# Patient Record
Sex: MALE | Race: WHITE | NOT HISPANIC OR LATINO
[De-identification: names, ages, dates, MRNs, and addresses within clinical notes are randomized per-mention and may not be internally consistent; named-entity substitution may affect disease eponyms.]

---

## 2021-05-20 ENCOUNTER — APPOINTMENT (OUTPATIENT)
Dept: UROLOGY | Facility: CLINIC | Age: 71
End: 2021-05-20
Payer: MEDICARE

## 2021-05-20 VITALS
WEIGHT: 273 LBS | DIASTOLIC BLOOD PRESSURE: 82 MMHG | BODY MASS INDEX: 36.18 KG/M2 | TEMPERATURE: 98.2 F | HEIGHT: 73 IN | OXYGEN SATURATION: 97 % | SYSTOLIC BLOOD PRESSURE: 128 MMHG | HEART RATE: 96 BPM

## 2021-05-20 DIAGNOSIS — Z86.79 PERSONAL HISTORY OF OTHER DISEASES OF THE CIRCULATORY SYSTEM: ICD-10-CM

## 2021-05-20 DIAGNOSIS — Z86.39 PERSONAL HISTORY OF OTHER ENDOCRINE, NUTRITIONAL AND METABOLIC DISEASE: ICD-10-CM

## 2021-05-20 DIAGNOSIS — Z87.442 PERSONAL HISTORY OF URINARY CALCULI: ICD-10-CM

## 2021-05-20 DIAGNOSIS — Z98.890 OTHER SPECIFIED POSTPROCEDURAL STATES: ICD-10-CM

## 2021-05-20 DIAGNOSIS — E11.9 TYPE 2 DIABETES MELLITUS W/OUT COMPLICATIONS: ICD-10-CM

## 2021-05-20 PROCEDURE — 99204 OFFICE O/P NEW MOD 45 MIN: CPT

## 2021-05-20 RX ORDER — BENAZEPRIL HYDROCHLORIDE 20 MG/1
20 TABLET, FILM COATED ORAL
Refills: 0 | Status: ACTIVE | COMMUNITY

## 2021-05-20 RX ORDER — GLIMEPIRIDE 4 MG/1
TABLET ORAL
Refills: 0 | Status: ACTIVE | COMMUNITY

## 2021-05-20 RX ORDER — SITAGLIPTIN AND METFORMIN HYDROCHLORIDE 50; 1000 MG/1; MG/1
TABLET, FILM COATED ORAL
Refills: 0 | Status: ACTIVE | COMMUNITY

## 2021-05-21 LAB
APPEARANCE: CLEAR
BACTERIA: NEGATIVE
BILIRUBIN URINE: NEGATIVE
BLOOD URINE: NEGATIVE
COLOR: NORMAL
GLUCOSE QUALITATIVE U: ABNORMAL
HYALINE CASTS: 0 /LPF
KETONES URINE: NEGATIVE
LEUKOCYTE ESTERASE URINE: NEGATIVE
MICROSCOPIC-UA: NORMAL
NITRITE URINE: NEGATIVE
PH URINE: 6
PROTEIN URINE: NEGATIVE
RED BLOOD CELLS URINE: 0 /HPF
SPECIFIC GRAVITY URINE: 1.02
SQUAMOUS EPITHELIAL CELLS: 0 /HPF
UROBILINOGEN URINE: NORMAL
WHITE BLOOD CELLS URINE: 0 /HPF

## 2021-05-21 NOTE — HISTORY OF PRESENT ILLNESS
[FreeTextEntry1] : ANGELLA COHN is a 70 year M who presents for ? kidney pain, stones, stone growth. H/o stones in the past- ? recent enlargement of stone, possible renal cyst, on outside imaging (no films or report). Mild voiding sx, worsened now; decreased flow. No hematuria. O/w feels well, no flank or abdominal pain.\par \par PMH: DM, htn, cholesterol, kidney stones\par PSH: stone removal, knee replacement\par FH: kidney stones.\par Meds: reviewed on history sheet\par SH: occasional etoh, quit smoking 28 years ago\par Allergies:nkda\par  [Weak Stream] : weak stream

## 2021-05-21 NOTE — ASSESSMENT
[FreeTextEntry1] : Mild voiding sx, and reported h/o stone, renal cyst which may have enlarged on outside US ([facility])\par \par D/w pt BPH issues as most likely for urinary flow issues--> can try saw palmetto as sx seem fairly mild at this time.  Of priority is to assess for degree of stone disease- pt reports no abd ct yet to fully assess stone burden, size, density, anatomy.\par \par H/o stones in the past---> ESWL\par \par 1. u/a with micro today\par 2. trial saw palmetto- pt prefers to starting with meds\par 3. CT stonehunt-- will change to CT urogram if u/a with sig rbc.\par 4. RTC to review, or can f/u as telehealth to review

## 2021-05-21 NOTE — REVIEW OF SYSTEMS
[Feeling Tired] : feeling tired [Abdominal Pain] : abdominal pain [Heartburn] : heartburn [Wake up at night to urinate  How many times?  ___] : wakes up to urinate [unfilled] times during the night [Slow urine stream] : slow urine stream [Joint Pain] : joint pain [Joint Swelling] : joint swelling [Feelings Of Weakness] : feelings of weakness [Negative] : Heme/Lymph [FreeTextEntry5] : KIDNEY STONES [FreeTextEntry2] : FREQUENT URINATION

## 2021-05-21 NOTE — LETTER BODY
[Dear  ___] : Dear  [unfilled], [Consult Letter:] : I had the pleasure of evaluating your patient, [unfilled]. [Please see my note below.] : Please see my note below. [Consult Closing:] : Thank you very much for allowing me to participate in the care of this patient.  If you have any questions, please do not hesitate to contact me. [Sincerely,] : Sincerely, [FreeTextEntry1] : ANGELLA COHN is a 70 year M who presents for ? kidney pain, stones, stone growth. H/o stones in the past- ? recent enlargement of stone, possible renal cyst, on outside imaging (no films or report). Mild voiding sx, worsened now; decreased flow. No hematuria. O/w feels well, no flank or abdominal pain.\par \par PMH: DM, htn, cholesterol, kidney stones\par PSH: stone removal, knee replacement\par FH: kidney stones.\par Meds: reviewed on history sheet\par SH: occasional etoh, quit smoking 28 years ago\par Allergies:nkda\par \par PE unremarkable\par \par Mild voiding sx, and reported h/o stone, renal cyst which may have enlarged on outside US ([facility])\par \par D/w pt BPH issues as most likely for urinary flow issues--> can try saw palmetto as sx seem fairly mild at this time.  Of priority is to assess for degree of stone disease- pt reports no abd ct yet to fully assess stone burden, size, density, anatomy.\par \par H/o stones in the past---> ESWL\par \par 1. u/a with micro today\par 2. trial saw palmetto- pt prefers to starting with meds\par 3. CT stonehunt-- will change to CT urogram if u/a with sig rbc.\par 4. RTC to review, or can f/u as telehealth to review

## 2021-05-25 ENCOUNTER — OUTPATIENT (OUTPATIENT)
Dept: OUTPATIENT SERVICES | Facility: HOSPITAL | Age: 71
LOS: 1 days | End: 2021-05-25
Payer: MEDICARE

## 2021-05-25 ENCOUNTER — APPOINTMENT (OUTPATIENT)
Dept: CT IMAGING | Facility: CLINIC | Age: 71
End: 2021-05-25
Payer: MEDICARE

## 2021-05-25 DIAGNOSIS — N20.0 CALCULUS OF KIDNEY: ICD-10-CM

## 2021-05-25 PROCEDURE — 74176 CT ABD & PELVIS W/O CONTRAST: CPT | Mod: 26,ME

## 2021-05-25 PROCEDURE — G1004: CPT

## 2021-05-25 PROCEDURE — 74176 CT ABD & PELVIS W/O CONTRAST: CPT

## 2021-05-28 ENCOUNTER — NON-APPOINTMENT (OUTPATIENT)
Age: 71
End: 2021-05-28

## 2021-06-10 ENCOUNTER — APPOINTMENT (OUTPATIENT)
Dept: UROLOGY | Facility: CLINIC | Age: 71
End: 2021-06-10
Payer: MEDICARE

## 2021-06-10 VITALS — TEMPERATURE: 98 F

## 2021-06-10 PROCEDURE — 99213 OFFICE O/P EST LOW 20 MIN: CPT

## 2021-06-15 NOTE — HISTORY OF PRESENT ILLNESS
[FreeTextEntry1] : Pt returns in f/u to review CT stone hunt- 5/25/21.\par \par Films reviewed with pt- has 8x6 mm left upper pole stone without hydro. Mult right renal cysts. Multiple bladder diverticula up to ~ 6 cm. No bladder stones. Markedly enlarged prostate with median lobe extending into bladder lumen.\par \par No other changes from recent appt.

## 2021-06-15 NOTE — ASSESSMENT
[FreeTextEntry1] : D/w pt at length; voiding sx troublesome for pt. Interested in surgical management given sx and large diverticulum.\par \par Options reviewed, including cysto, but with configuration of prostate on CT would suggest robotic prostatectomy optimal, as would allow treatment of the diverticulum at same time. Options for medical management again reviewed.\par \par He would like eval with Dr. Davenport- will arrange

## 2021-06-21 ENCOUNTER — APPOINTMENT (OUTPATIENT)
Dept: UROLOGY | Facility: CLINIC | Age: 71
End: 2021-06-21
Payer: MEDICARE

## 2021-06-21 PROCEDURE — 99213 OFFICE O/P EST LOW 20 MIN: CPT

## 2021-06-23 NOTE — ASSESSMENT
[FreeTextEntry1] : Discussed management with suprapubic prostatectomy and diverticulectomy. Discussed risks of surgery including bleeding, infection, injury to intra-abdominal contents, urine leak. Discussed risks of ED and incontinence (low risk for this procedure). Discussed unclear how close tic is to UO and may need stent and or ureteral reimplant. \par --Schedule for robotic SPP with diverticulectomy and cystoscopy\par --Medical clearance

## 2021-06-25 ENCOUNTER — OUTPATIENT (OUTPATIENT)
Dept: OUTPATIENT SERVICES | Facility: HOSPITAL | Age: 71
LOS: 1 days | End: 2021-06-25

## 2021-06-25 VITALS
DIASTOLIC BLOOD PRESSURE: 70 MMHG | RESPIRATION RATE: 16 BRPM | HEART RATE: 88 BPM | WEIGHT: 276.02 LBS | HEIGHT: 73 IN | SYSTOLIC BLOOD PRESSURE: 150 MMHG | TEMPERATURE: 97 F | OXYGEN SATURATION: 97 %

## 2021-06-25 DIAGNOSIS — E11.9 TYPE 2 DIABETES MELLITUS WITHOUT COMPLICATIONS: ICD-10-CM

## 2021-06-25 DIAGNOSIS — N40.0 BENIGN PROSTATIC HYPERPLASIA WITHOUT LOWER URINARY TRACT SYMPTOMS: ICD-10-CM

## 2021-06-25 DIAGNOSIS — Z96.659 PRESENCE OF UNSPECIFIED ARTIFICIAL KNEE JOINT: Chronic | ICD-10-CM

## 2021-06-25 DIAGNOSIS — G47.33 OBSTRUCTIVE SLEEP APNEA (ADULT) (PEDIATRIC): ICD-10-CM

## 2021-06-25 DIAGNOSIS — N32.3 DIVERTICULUM OF BLADDER: ICD-10-CM

## 2021-06-25 DIAGNOSIS — N32.1 VESICOINTESTINAL FISTULA: ICD-10-CM

## 2021-06-25 DIAGNOSIS — I10 ESSENTIAL (PRIMARY) HYPERTENSION: ICD-10-CM

## 2021-06-25 LAB
A1C WITH ESTIMATED AVERAGE GLUCOSE RESULT: 7.4 % — HIGH (ref 4–5.6)
ANION GAP SERPL CALC-SCNC: 18 MMOL/L — HIGH (ref 7–14)
BLD GP AB SCN SERPL QL: NEGATIVE — SIGNIFICANT CHANGE UP
BUN SERPL-MCNC: 11 MG/DL — SIGNIFICANT CHANGE UP (ref 7–23)
CALCIUM SERPL-MCNC: 10.3 MG/DL — SIGNIFICANT CHANGE UP (ref 8.4–10.5)
CHLORIDE SERPL-SCNC: 100 MMOL/L — SIGNIFICANT CHANGE UP (ref 98–107)
CO2 SERPL-SCNC: 23 MMOL/L — SIGNIFICANT CHANGE UP (ref 22–31)
CREAT SERPL-MCNC: 0.54 MG/DL — SIGNIFICANT CHANGE UP (ref 0.5–1.3)
ESTIMATED AVERAGE GLUCOSE: 166 MG/DL — HIGH (ref 68–114)
GLUCOSE SERPL-MCNC: 248 MG/DL — HIGH (ref 70–99)
HCT VFR BLD CALC: 46.3 % — SIGNIFICANT CHANGE UP (ref 39–50)
HGB BLD-MCNC: 15.3 G/DL — SIGNIFICANT CHANGE UP (ref 13–17)
MCHC RBC-ENTMCNC: 28.9 PG — SIGNIFICANT CHANGE UP (ref 27–34)
MCHC RBC-ENTMCNC: 33 GM/DL — SIGNIFICANT CHANGE UP (ref 32–36)
MCV RBC AUTO: 87.4 FL — SIGNIFICANT CHANGE UP (ref 80–100)
NRBC # BLD: 0 /100 WBCS — SIGNIFICANT CHANGE UP
NRBC # FLD: 0 K/UL — SIGNIFICANT CHANGE UP
PLATELET # BLD AUTO: 282 K/UL — SIGNIFICANT CHANGE UP (ref 150–400)
POTASSIUM SERPL-MCNC: 3.6 MMOL/L — SIGNIFICANT CHANGE UP (ref 3.5–5.3)
POTASSIUM SERPL-SCNC: 3.6 MMOL/L — SIGNIFICANT CHANGE UP (ref 3.5–5.3)
RBC # BLD: 5.3 M/UL — SIGNIFICANT CHANGE UP (ref 4.2–5.8)
RBC # FLD: 13.8 % — SIGNIFICANT CHANGE UP (ref 10.3–14.5)
RH IG SCN BLD-IMP: POSITIVE — SIGNIFICANT CHANGE UP
SODIUM SERPL-SCNC: 141 MMOL/L — SIGNIFICANT CHANGE UP (ref 135–145)
WBC # BLD: 8.09 K/UL — SIGNIFICANT CHANGE UP (ref 3.8–10.5)
WBC # FLD AUTO: 8.09 K/UL — SIGNIFICANT CHANGE UP (ref 3.8–10.5)

## 2021-06-25 RX ORDER — SODIUM CHLORIDE 9 MG/ML
1000 INJECTION, SOLUTION INTRAVENOUS
Refills: 0 | Status: DISCONTINUED | OUTPATIENT
Start: 2021-06-29 | End: 2021-06-29

## 2021-06-25 NOTE — H&P PST ADULT - NSICDXPASTMEDICALHX_GEN_ALL_CORE_FT
PAST MEDICAL HISTORY:  Arthritis s/p Bilateral TKR    Diabetes mellitus x 10 years FS this am 160    HTN (hypertension)     Hypercholesterolemia      PAST MEDICAL HISTORY:  Arthritis s/p Bilateral TKR    Diabetes mellitus x 10 years FS this am 160    HTN (hypertension)     Hypercholesterolemia     Kidney stones Per pt Left ; being monitored     PAST MEDICAL HISTORY:  Arthritis s/p Bilateral TKR    Diabetes mellitus x 10 years FS this am 160    HTN (hypertension)     Hypercholesterolemia     Kidney stones Per pt Left ; as per pt ; being monitored

## 2021-06-25 NOTE — H&P PST ADULT - NSICDXFAMILYHX_GEN_ALL_CORE_FT
FAMILY HISTORY:  Father  Still living? Unknown  FH: diabetes mellitus, Age at diagnosis: Age Unknown    Grandparent  Still living? Unknown  FH: diabetes mellitus, Age at diagnosis: Age Unknown    Aunt  Still living? No  FH: diabetes mellitus, Age at diagnosis: Age Unknown

## 2021-06-25 NOTE — H&P PST ADULT - HISTORY OF PRESENT ILLNESS
Pt is a 70 y.o. male ; pt reports h/o Left renal stones ; pt to surgeon a sonogram was done; per pt my prostate is enlarged and it is putting pressure on my bladder  " Pt now presents for Robotic Assisted Laparoscopic Suprapubic Prostatectomy with Bladder Diverticulectomy Cystoscopy  Pt is a 70 y.o. male ; pt to surgeon a sonogram was done; per pt my prostate is enlarged and it is putting pressure on my bladder  " Pt now presents for Robotic Assisted Laparoscopic Suprapubic Prostatectomy with Bladder Diverticulectomy Cystoscopy

## 2021-06-25 NOTE — H&P PST ADULT - NSICDXPROBLEM_GEN_ALL_CORE_FT
PROBLEM DIAGNOSES  Problem: Enlarged prostate  Assessment and Plan:     Problem: HTN (hypertension)  Assessment and Plan:     Problem: Diabetes mellitus  Assessment and Plan:        PROBLEM DIAGNOSES  Problem: Enlarged prostate  Assessment and Plan: Robotic Assisted Laparoscopic Suprapubic Prostatectomy with Bladder Diverticulectomy Cystoscopy  Pre op instructions including Pepcid for GI Prophalaxis and Hibiclens with teach back reviewed with pt ; pt verbalized good understanding of pre op instructions  Pt to Dr Crowley for pre op Evaluation EKG [ 6/22/2021 } to be faxed s/w Steph      Problem: HTN (hypertension)  Assessment and Plan: Pt to take Nifedipine and Benazepril dos     Problem: Diabetes mellitus  Assessment and Plan: BMP, HGBA1C done 6/25/2021  Janumet to be held evening prior to surgery and dos   Glimepiride to be held evening prior to surgery   Jardiance to be held 3 days pre op  Pt to review pre op plan for diabetes with Dr Crowley prescribing physician  FS dos     Problem: PENNY (obstructive sleep apnea)  Assessment and Plan: PENNY Precautions  OR Booking notified via fax       PROBLEM DIAGNOSES  Problem: Enlarged prostate  Assessment and Plan: Robotic Assisted Laparoscopic Suprapubic Prostatectomy with Bladder Diverticulectomy Cystoscopy  Pre op instructions including Pepcid for GI Prophalaxis and Hibiclens with teach back reviewed with pt ; pt verbalized good understanding of pre op instructions  Pt to Dr Crowley for pre op Evaluation EKG [ 6/22/2021 } to be faxed s/w Steph      Problem: HTN (hypertension)  Assessment and Plan: Pt to take Nifedipine and Benazepril dos     Problem: Diabetes mellitus  Assessment and Plan: BMP, HGBA1C done 6/25/2021  Janumet to be held evening prior to surgery and dos   Glimepiride to be held evening prior to surgery   Jardiance to be held 3 days pre op  Pt to review pre op plan for diabetes with Dr Crowley ; prescribing physician  FS dos     Problem: PENNY (obstructive sleep apnea)  Assessment and Plan: PENNY Precautions  OR Booking notified via fax

## 2021-06-26 LAB
CULTURE RESULTS: NO GROWTH — SIGNIFICANT CHANGE UP
SPECIMEN SOURCE: SIGNIFICANT CHANGE UP

## 2021-06-28 ENCOUNTER — TRANSCRIPTION ENCOUNTER (OUTPATIENT)
Age: 71
End: 2021-06-28

## 2021-06-28 NOTE — ASU PATIENT PROFILE, ADULT - NS PRO ABUSE SCREEN AFRAID ANYONE YN
REVIEW OF SYSTEMS:    GENERAL:  Patient denies fever, chills, tiredness, malaise.  EYES:  Patient denies blurred vision, double vision, pain, burning and itching.   NEUROLOGIC:  Patient denies tremors, dizzy spells, numbness, tingling, vision change, loss of balance.  ENDOCRINE:  Patient denies excessive thirst, heat intolerance, lymphnode enlargement.  GASTROINTESTINAL:  Patient denies abdominal pain, nausea/vomiting, indigestion/heartburn, diarrhea, constipation.  CARDIOVASCUALR:  Patient denies chest pain, varicose veins, high blood pressure.  SKIN:  Patient denies skin rashes, boils, persistent itching, acne.  MUSCULOSKELETAL:  Patient denies joint pain, neck pain, back pain, leg swelling.  ENT/MOUTH:  Patient denies ear infections, sore throats, sinus problems, hearing loss.  RESPIRATORY:  Patient denies wheezing, frequent cough, shortness of breath.   :  Patient denies urine retention, painful urination, urinary frequency, blood in urine, nocturia.  HEME/LYMPHATICE:  Patient denies swollen glands, blood clotting problems.   PSYCHOLOGICAL:  Patient denies anxiety, depression.         no

## 2021-06-29 ENCOUNTER — RESULT REVIEW (OUTPATIENT)
Age: 71
End: 2021-06-29

## 2021-06-29 ENCOUNTER — INPATIENT (INPATIENT)
Facility: HOSPITAL | Age: 71
LOS: 1 days | Discharge: HOME | End: 2021-07-01
Attending: UROLOGY | Admitting: UROLOGY
Payer: MEDICARE

## 2021-06-29 ENCOUNTER — APPOINTMENT (OUTPATIENT)
Dept: UROLOGY | Facility: HOSPITAL | Age: 71
End: 2021-06-29

## 2021-06-29 VITALS
TEMPERATURE: 98 F | HEIGHT: 73 IN | WEIGHT: 276.02 LBS | RESPIRATION RATE: 16 BRPM | DIASTOLIC BLOOD PRESSURE: 79 MMHG | OXYGEN SATURATION: 97 % | SYSTOLIC BLOOD PRESSURE: 138 MMHG | HEART RATE: 88 BPM

## 2021-06-29 DIAGNOSIS — N32.3 DIVERTICULUM OF BLADDER: ICD-10-CM

## 2021-06-29 DIAGNOSIS — Z96.659 PRESENCE OF UNSPECIFIED ARTIFICIAL KNEE JOINT: Chronic | ICD-10-CM

## 2021-06-29 DIAGNOSIS — N32.1 VESICOINTESTINAL FISTULA: ICD-10-CM

## 2021-06-29 LAB
ANION GAP SERPL CALC-SCNC: 14 MMOL/L — SIGNIFICANT CHANGE UP (ref 7–14)
BASOPHILS # BLD AUTO: 0.04 K/UL — SIGNIFICANT CHANGE UP (ref 0–0.2)
BASOPHILS NFR BLD AUTO: 0.2 % — SIGNIFICANT CHANGE UP (ref 0–2)
BUN SERPL-MCNC: 9 MG/DL — SIGNIFICANT CHANGE UP (ref 7–23)
CALCIUM SERPL-MCNC: 8.9 MG/DL — SIGNIFICANT CHANGE UP (ref 8.4–10.5)
CHLORIDE SERPL-SCNC: 103 MMOL/L — SIGNIFICANT CHANGE UP (ref 98–107)
CO2 SERPL-SCNC: 24 MMOL/L — SIGNIFICANT CHANGE UP (ref 22–31)
CREAT SERPL-MCNC: 0.51 MG/DL — SIGNIFICANT CHANGE UP (ref 0.5–1.3)
EOSINOPHIL # BLD AUTO: 0.04 K/UL — SIGNIFICANT CHANGE UP (ref 0–0.5)
EOSINOPHIL NFR BLD AUTO: 0.2 % — SIGNIFICANT CHANGE UP (ref 0–6)
GLUCOSE BLDC GLUCOMTR-MCNC: 175 MG/DL — HIGH (ref 70–99)
GLUCOSE BLDC GLUCOMTR-MCNC: 195 MG/DL — HIGH (ref 70–99)
GLUCOSE BLDC GLUCOMTR-MCNC: 204 MG/DL — HIGH (ref 70–99)
GLUCOSE SERPL-MCNC: 230 MG/DL — HIGH (ref 70–99)
HCT VFR BLD CALC: 42.2 % — SIGNIFICANT CHANGE UP (ref 39–50)
HGB BLD-MCNC: 13.9 G/DL — SIGNIFICANT CHANGE UP (ref 13–17)
IANC: 13.61 K/UL — HIGH (ref 1.5–8.5)
IMM GRANULOCYTES NFR BLD AUTO: 0.3 % — SIGNIFICANT CHANGE UP (ref 0–1.5)
LYMPHOCYTES # BLD AUTO: 13.1 % — SIGNIFICANT CHANGE UP (ref 13–44)
LYMPHOCYTES # BLD AUTO: 2.2 K/UL — SIGNIFICANT CHANGE UP (ref 1–3.3)
MCHC RBC-ENTMCNC: 28.7 PG — SIGNIFICANT CHANGE UP (ref 27–34)
MCHC RBC-ENTMCNC: 32.9 GM/DL — SIGNIFICANT CHANGE UP (ref 32–36)
MCV RBC AUTO: 87.2 FL — SIGNIFICANT CHANGE UP (ref 80–100)
MONOCYTES # BLD AUTO: 0.88 K/UL — SIGNIFICANT CHANGE UP (ref 0–0.9)
MONOCYTES NFR BLD AUTO: 5.2 % — SIGNIFICANT CHANGE UP (ref 2–14)
NEUTROPHILS # BLD AUTO: 13.61 K/UL — HIGH (ref 1.8–7.4)
NEUTROPHILS NFR BLD AUTO: 81 % — HIGH (ref 43–77)
NRBC # BLD: 0 /100 WBCS — SIGNIFICANT CHANGE UP
NRBC # FLD: 0 K/UL — SIGNIFICANT CHANGE UP
PLATELET # BLD AUTO: 254 K/UL — SIGNIFICANT CHANGE UP (ref 150–400)
POTASSIUM SERPL-MCNC: 3.9 MMOL/L — SIGNIFICANT CHANGE UP (ref 3.5–5.3)
POTASSIUM SERPL-SCNC: 3.9 MMOL/L — SIGNIFICANT CHANGE UP (ref 3.5–5.3)
RBC # BLD: 4.84 M/UL — SIGNIFICANT CHANGE UP (ref 4.2–5.8)
RBC # FLD: 13.6 % — SIGNIFICANT CHANGE UP (ref 10.3–14.5)
SODIUM SERPL-SCNC: 141 MMOL/L — SIGNIFICANT CHANGE UP (ref 135–145)
WBC # BLD: 16.82 K/UL — HIGH (ref 3.8–10.5)
WBC # FLD AUTO: 16.82 K/UL — HIGH (ref 3.8–10.5)

## 2021-06-29 PROCEDURE — 51999 UNLISTED LAPS PX BLADDER: CPT

## 2021-06-29 PROCEDURE — 88307 TISSUE EXAM BY PATHOLOGIST: CPT | Mod: 26

## 2021-06-29 PROCEDURE — 88309 TISSUE EXAM BY PATHOLOGIST: CPT | Mod: 26

## 2021-06-29 PROCEDURE — 55899 UNLISTED PX MALE GENITAL SYS: CPT

## 2021-06-29 RX ORDER — SITAGLIPTIN AND METFORMIN HYDROCHLORIDE 500; 50 MG/1; MG/1
1 TABLET, FILM COATED ORAL
Qty: 0 | Refills: 0 | DISCHARGE

## 2021-06-29 RX ORDER — CEFAZOLIN SODIUM 1 G
3000 VIAL (EA) INJECTION EVERY 8 HOURS
Refills: 0 | Status: DISCONTINUED | OUTPATIENT
Start: 2021-06-29 | End: 2021-07-01

## 2021-06-29 RX ORDER — OXYCODONE HYDROCHLORIDE 5 MG/1
5 TABLET ORAL ONCE
Refills: 0 | Status: DISCONTINUED | OUTPATIENT
Start: 2021-06-29 | End: 2021-06-30

## 2021-06-29 RX ORDER — BENAZEPRIL HYDROCHLORIDE AND HYDROCHLOROTHIAZIDE 10; 12.5 MG/1; MG/1
1 TABLET, FILM COATED ORAL
Qty: 0 | Refills: 0 | DISCHARGE

## 2021-06-29 RX ORDER — GLUCAGON INJECTION, SOLUTION 0.5 MG/.1ML
1 INJECTION, SOLUTION SUBCUTANEOUS ONCE
Refills: 0 | Status: DISCONTINUED | OUTPATIENT
Start: 2021-06-29 | End: 2021-07-01

## 2021-06-29 RX ORDER — NIFEDIPINE 30 MG
1 TABLET, EXTENDED RELEASE 24 HR ORAL
Qty: 0 | Refills: 0 | DISCHARGE

## 2021-06-29 RX ORDER — NIFEDIPINE 30 MG
30 TABLET, EXTENDED RELEASE 24 HR ORAL DAILY
Refills: 0 | Status: DISCONTINUED | OUTPATIENT
Start: 2021-06-29 | End: 2021-07-01

## 2021-06-29 RX ORDER — SODIUM CHLORIDE 9 MG/ML
1000 INJECTION, SOLUTION INTRAVENOUS
Refills: 0 | Status: DISCONTINUED | OUTPATIENT
Start: 2021-06-29 | End: 2021-06-30

## 2021-06-29 RX ORDER — CEFAZOLIN SODIUM 1 G
2000 VIAL (EA) INJECTION EVERY 8 HOURS
Refills: 0 | Status: DISCONTINUED | OUTPATIENT
Start: 2021-06-29 | End: 2021-06-29

## 2021-06-29 RX ORDER — ONDANSETRON 8 MG/1
4 TABLET, FILM COATED ORAL ONCE
Refills: 0 | Status: DISCONTINUED | OUTPATIENT
Start: 2021-06-29 | End: 2021-06-30

## 2021-06-29 RX ORDER — HEPARIN SODIUM 5000 [USP'U]/ML
5000 INJECTION INTRAVENOUS; SUBCUTANEOUS EVERY 8 HOURS
Refills: 0 | Status: DISCONTINUED | OUTPATIENT
Start: 2021-06-29 | End: 2021-07-01

## 2021-06-29 RX ORDER — ACETAMINOPHEN 500 MG
650 TABLET ORAL EVERY 6 HOURS
Refills: 0 | Status: DISCONTINUED | OUTPATIENT
Start: 2021-06-29 | End: 2021-07-01

## 2021-06-29 RX ORDER — LISINOPRIL 2.5 MG/1
20 TABLET ORAL DAILY
Refills: 0 | Status: DISCONTINUED | OUTPATIENT
Start: 2021-06-29 | End: 2021-07-01

## 2021-06-29 RX ORDER — DEXTROSE 50 % IN WATER 50 %
25 SYRINGE (ML) INTRAVENOUS ONCE
Refills: 0 | Status: DISCONTINUED | OUTPATIENT
Start: 2021-06-29 | End: 2021-07-01

## 2021-06-29 RX ORDER — SODIUM CHLORIDE 9 MG/ML
1000 INJECTION, SOLUTION INTRAVENOUS
Refills: 0 | Status: DISCONTINUED | OUTPATIENT
Start: 2021-06-29 | End: 2021-07-01

## 2021-06-29 RX ORDER — HYDROCHLOROTHIAZIDE 25 MG
25 TABLET ORAL DAILY
Refills: 0 | Status: DISCONTINUED | OUTPATIENT
Start: 2021-06-29 | End: 2021-07-01

## 2021-06-29 RX ORDER — DEXTROSE 50 % IN WATER 50 %
12.5 SYRINGE (ML) INTRAVENOUS ONCE
Refills: 0 | Status: DISCONTINUED | OUTPATIENT
Start: 2021-06-29 | End: 2021-07-01

## 2021-06-29 RX ORDER — SENNA PLUS 8.6 MG/1
2 TABLET ORAL AT BEDTIME
Refills: 0 | Status: DISCONTINUED | OUTPATIENT
Start: 2021-06-29 | End: 2021-07-01

## 2021-06-29 RX ORDER — INSULIN LISPRO 100/ML
VIAL (ML) SUBCUTANEOUS AT BEDTIME
Refills: 0 | Status: DISCONTINUED | OUTPATIENT
Start: 2021-06-29 | End: 2021-07-01

## 2021-06-29 RX ORDER — HYDROMORPHONE HYDROCHLORIDE 2 MG/ML
0.5 INJECTION INTRAMUSCULAR; INTRAVENOUS; SUBCUTANEOUS
Refills: 0 | Status: DISCONTINUED | OUTPATIENT
Start: 2021-06-29 | End: 2021-06-30

## 2021-06-29 RX ORDER — LIDOCAINE 4 G/100G
1 CREAM TOPICAL
Refills: 0 | Status: DISCONTINUED | OUTPATIENT
Start: 2021-06-29 | End: 2021-07-01

## 2021-06-29 RX ORDER — ATORVASTATIN CALCIUM 80 MG/1
80 TABLET, FILM COATED ORAL AT BEDTIME
Refills: 0 | Status: DISCONTINUED | OUTPATIENT
Start: 2021-06-29 | End: 2021-07-01

## 2021-06-29 RX ORDER — OXYCODONE HYDROCHLORIDE 5 MG/1
5 TABLET ORAL EVERY 4 HOURS
Refills: 0 | Status: DISCONTINUED | OUTPATIENT
Start: 2021-06-29 | End: 2021-07-01

## 2021-06-29 RX ORDER — DEXTROSE 50 % IN WATER 50 %
15 SYRINGE (ML) INTRAVENOUS ONCE
Refills: 0 | Status: DISCONTINUED | OUTPATIENT
Start: 2021-06-29 | End: 2021-07-01

## 2021-06-29 RX ORDER — OXYBUTYNIN CHLORIDE 5 MG
5 TABLET ORAL THREE TIMES A DAY
Refills: 0 | Status: DISCONTINUED | OUTPATIENT
Start: 2021-06-29 | End: 2021-07-01

## 2021-06-29 RX ORDER — INSULIN LISPRO 100/ML
VIAL (ML) SUBCUTANEOUS
Refills: 0 | Status: DISCONTINUED | OUTPATIENT
Start: 2021-06-29 | End: 2021-07-01

## 2021-06-29 RX ORDER — OXYCODONE HYDROCHLORIDE 5 MG/1
10 TABLET ORAL EVERY 4 HOURS
Refills: 0 | Status: DISCONTINUED | OUTPATIENT
Start: 2021-06-29 | End: 2021-07-01

## 2021-06-29 RX ORDER — ROSUVASTATIN CALCIUM 5 MG/1
1 TABLET ORAL
Qty: 0 | Refills: 0 | DISCHARGE

## 2021-06-29 RX ORDER — METOCLOPRAMIDE HCL 10 MG
10 TABLET ORAL EVERY 6 HOURS
Refills: 0 | Status: DISCONTINUED | OUTPATIENT
Start: 2021-06-29 | End: 2021-07-01

## 2021-06-29 RX ADMIN — Medication 5 MILLIGRAM(S): at 21:52

## 2021-06-29 RX ADMIN — SODIUM CHLORIDE 125 MILLILITER(S): 9 INJECTION, SOLUTION INTRAVENOUS at 19:45

## 2021-06-29 RX ADMIN — LIDOCAINE 1 APPLICATION(S): 4 CREAM TOPICAL at 20:52

## 2021-06-29 RX ADMIN — SODIUM CHLORIDE 30 MILLILITER(S): 9 INJECTION, SOLUTION INTRAVENOUS at 11:53

## 2021-06-29 RX ADMIN — HYDROMORPHONE HYDROCHLORIDE 0.5 MILLIGRAM(S): 2 INJECTION INTRAMUSCULAR; INTRAVENOUS; SUBCUTANEOUS at 20:56

## 2021-06-29 RX ADMIN — SENNA PLUS 2 TABLET(S): 8.6 TABLET ORAL at 22:59

## 2021-06-29 RX ADMIN — HYDROMORPHONE HYDROCHLORIDE 0.5 MILLIGRAM(S): 2 INJECTION INTRAMUSCULAR; INTRAVENOUS; SUBCUTANEOUS at 20:43

## 2021-06-29 RX ADMIN — HEPARIN SODIUM 5000 UNIT(S): 5000 INJECTION INTRAVENOUS; SUBCUTANEOUS at 22:58

## 2021-06-29 RX ADMIN — ATORVASTATIN CALCIUM 80 MILLIGRAM(S): 80 TABLET, FILM COATED ORAL at 22:59

## 2021-06-29 NOTE — BRIEF OPERATIVE NOTE - OPERATION/FINDINGS
Easily identifiable L bladder diverticulum, resected and closed off the os and bladder in 2 layers.  Robotic SPP, large intravesical component of the prostate removed without issue

## 2021-06-29 NOTE — BRIEF OPERATIVE NOTE - NSICDXBRIEFPROCEDURE_GEN_ALL_CORE_FT
PROCEDURES:  Prostatectomy, robot-assisted, suprapubic approach 29-Jun-2021 19:17:03  Zaki Aguilar  Laparoscopic excision of bladder diverticulum 29-Jun-2021 19:17:18  Zaki Aguilar

## 2021-06-29 NOTE — BRIEF OPERATIVE NOTE - NSICDXBRIEFPOSTOP_GEN_ALL_CORE_FT
POST-OP DIAGNOSIS:  Bladder diverticulum 29-Jun-2021 19:17:53  Zaki Aguilar  BPH with urinary obstruction 29-Jun-2021 19:18:04  Zaki Aguilar

## 2021-06-29 NOTE — BRIEF OPERATIVE NOTE - NSICDXBRIEFPREOP_GEN_ALL_CORE_FT
PRE-OP DIAGNOSIS:  Bladder diverticulum 29-Jun-2021 19:17:28  Zaki Aguilar  BPH with urinary obstruction 29-Jun-2021 19:17:42  Zaki Aguilar

## 2021-06-29 NOTE — BRIEF OPERATIVE NOTE - VENOUS THROMBOEMBOLISM PROPHYLAXIS THERAPY
SCDs - ID note appreciated  - started ertapenem q24  through 11/10  - family amenable to home infusions, will need PICC, scheduled for Monday

## 2021-06-30 DIAGNOSIS — E78.00 PURE HYPERCHOLESTEROLEMIA, UNSPECIFIED: ICD-10-CM

## 2021-06-30 DIAGNOSIS — D62 ACUTE POSTHEMORRHAGIC ANEMIA: ICD-10-CM

## 2021-06-30 DIAGNOSIS — Z29.9 ENCOUNTER FOR PROPHYLACTIC MEASURES, UNSPECIFIED: ICD-10-CM

## 2021-06-30 LAB
ANION GAP SERPL CALC-SCNC: 14 MMOL/L — SIGNIFICANT CHANGE UP (ref 7–14)
BASOPHILS # BLD AUTO: 0.03 K/UL — SIGNIFICANT CHANGE UP (ref 0–0.2)
BASOPHILS NFR BLD AUTO: 0.3 % — SIGNIFICANT CHANGE UP (ref 0–2)
BUN SERPL-MCNC: 9 MG/DL — SIGNIFICANT CHANGE UP (ref 7–23)
CALCIUM SERPL-MCNC: 8.9 MG/DL — SIGNIFICANT CHANGE UP (ref 8.4–10.5)
CHLORIDE SERPL-SCNC: 103 MMOL/L — SIGNIFICANT CHANGE UP (ref 98–107)
CO2 SERPL-SCNC: 24 MMOL/L — SIGNIFICANT CHANGE UP (ref 22–31)
COVID-19 SPIKE DOMAIN AB INTERP: POSITIVE
COVID-19 SPIKE DOMAIN ANTIBODY RESULT: >250 U/ML — HIGH
CREAT FLD-MCNC: 0.53 MG/DL — SIGNIFICANT CHANGE UP
CREAT SERPL-MCNC: 0.55 MG/DL — SIGNIFICANT CHANGE UP (ref 0.5–1.3)
EOSINOPHIL # BLD AUTO: 0.04 K/UL — SIGNIFICANT CHANGE UP (ref 0–0.5)
EOSINOPHIL NFR BLD AUTO: 0.4 % — SIGNIFICANT CHANGE UP (ref 0–6)
GLUCOSE BLDC GLUCOMTR-MCNC: 157 MG/DL — HIGH (ref 70–99)
GLUCOSE BLDC GLUCOMTR-MCNC: 187 MG/DL — HIGH (ref 70–99)
GLUCOSE BLDC GLUCOMTR-MCNC: 189 MG/DL — HIGH (ref 70–99)
GLUCOSE BLDC GLUCOMTR-MCNC: 195 MG/DL — HIGH (ref 70–99)
GLUCOSE SERPL-MCNC: 170 MG/DL — HIGH (ref 70–99)
HCT VFR BLD CALC: 39.5 % — SIGNIFICANT CHANGE UP (ref 39–50)
HGB BLD-MCNC: 12.7 G/DL — LOW (ref 13–17)
IANC: 7.56 K/UL — SIGNIFICANT CHANGE UP (ref 1.5–8.5)
IMM GRANULOCYTES NFR BLD AUTO: 0.4 % — SIGNIFICANT CHANGE UP (ref 0–1.5)
LYMPHOCYTES # BLD AUTO: 1.57 K/UL — SIGNIFICANT CHANGE UP (ref 1–3.3)
LYMPHOCYTES # BLD AUTO: 15.2 % — SIGNIFICANT CHANGE UP (ref 13–44)
MCHC RBC-ENTMCNC: 28.5 PG — SIGNIFICANT CHANGE UP (ref 27–34)
MCHC RBC-ENTMCNC: 32.2 GM/DL — SIGNIFICANT CHANGE UP (ref 32–36)
MCV RBC AUTO: 88.6 FL — SIGNIFICANT CHANGE UP (ref 80–100)
MONOCYTES # BLD AUTO: 1.1 K/UL — HIGH (ref 0–0.9)
MONOCYTES NFR BLD AUTO: 10.6 % — SIGNIFICANT CHANGE UP (ref 2–14)
NEUTROPHILS # BLD AUTO: 7.56 K/UL — HIGH (ref 1.8–7.4)
NEUTROPHILS NFR BLD AUTO: 73.1 % — SIGNIFICANT CHANGE UP (ref 43–77)
NRBC # BLD: 0 /100 WBCS — SIGNIFICANT CHANGE UP
NRBC # FLD: 0 K/UL — SIGNIFICANT CHANGE UP
PLATELET # BLD AUTO: 224 K/UL — SIGNIFICANT CHANGE UP (ref 150–400)
POTASSIUM SERPL-MCNC: 3.7 MMOL/L — SIGNIFICANT CHANGE UP (ref 3.5–5.3)
POTASSIUM SERPL-SCNC: 3.7 MMOL/L — SIGNIFICANT CHANGE UP (ref 3.5–5.3)
RBC # BLD: 4.46 M/UL — SIGNIFICANT CHANGE UP (ref 4.2–5.8)
RBC # FLD: 13.9 % — SIGNIFICANT CHANGE UP (ref 10.3–14.5)
SARS-COV-2 IGG+IGM SERPL QL IA: >250 U/ML — HIGH
SARS-COV-2 IGG+IGM SERPL QL IA: POSITIVE
SODIUM SERPL-SCNC: 141 MMOL/L — SIGNIFICANT CHANGE UP (ref 135–145)
WBC # BLD: 10.34 K/UL — SIGNIFICANT CHANGE UP (ref 3.8–10.5)
WBC # FLD AUTO: 10.34 K/UL — SIGNIFICANT CHANGE UP (ref 3.8–10.5)

## 2021-06-30 PROCEDURE — 99222 1ST HOSP IP/OBS MODERATE 55: CPT

## 2021-06-30 RX ORDER — SODIUM CHLORIDE 9 MG/ML
1000 INJECTION, SOLUTION INTRAVENOUS
Refills: 0 | Status: DISCONTINUED | OUTPATIENT
Start: 2021-06-30 | End: 2021-07-01

## 2021-06-30 RX ADMIN — HEPARIN SODIUM 5000 UNIT(S): 5000 INJECTION INTRAVENOUS; SUBCUTANEOUS at 06:14

## 2021-06-30 RX ADMIN — HEPARIN SODIUM 5000 UNIT(S): 5000 INJECTION INTRAVENOUS; SUBCUTANEOUS at 21:06

## 2021-06-30 RX ADMIN — SODIUM CHLORIDE 75 MILLILITER(S): 9 INJECTION, SOLUTION INTRAVENOUS at 09:20

## 2021-06-30 RX ADMIN — ATORVASTATIN CALCIUM 80 MILLIGRAM(S): 80 TABLET, FILM COATED ORAL at 21:06

## 2021-06-30 RX ADMIN — LISINOPRIL 20 MILLIGRAM(S): 2.5 TABLET ORAL at 06:14

## 2021-06-30 RX ADMIN — Medication 25 MILLIGRAM(S): at 06:14

## 2021-06-30 RX ADMIN — Medication 30 MILLIGRAM(S): at 06:14

## 2021-06-30 RX ADMIN — Medication 100 MILLIGRAM(S): at 03:18

## 2021-06-30 RX ADMIN — HEPARIN SODIUM 5000 UNIT(S): 5000 INJECTION INTRAVENOUS; SUBCUTANEOUS at 14:30

## 2021-06-30 RX ADMIN — Medication 1: at 11:36

## 2021-06-30 RX ADMIN — Medication 100 MILLIGRAM(S): at 18:22

## 2021-06-30 RX ADMIN — Medication 1: at 07:49

## 2021-06-30 RX ADMIN — SENNA PLUS 2 TABLET(S): 8.6 TABLET ORAL at 21:06

## 2021-06-30 RX ADMIN — Medication 100 MILLIGRAM(S): at 11:15

## 2021-06-30 RX ADMIN — Medication 1: at 16:55

## 2021-06-30 NOTE — CONSULT NOTE ADULT - PROBLEM SELECTOR RECOMMENDATION 9
-h/o BPH, bladder diverticuli, L renal stone  -s/p p robotic assisted lap suprapubic prostatectomy with bladder diverticulectomy on 6/29  -postop management per , IVF, pain control, CBI  -trend CBC -h/o BPH, bladder diverticuli, L renal stone  -s/p p robotic assisted lap suprapubic prostatectomy with bladder diverticulectomy on 6/29  -postop management per , IVF, pain control, CBI  -HAYDER creat 0.53 = serum creat --> no leak  -trend CBC

## 2021-06-30 NOTE — CONSULT NOTE ADULT - PROBLEM SELECTOR RECOMMENDATION 2
-trend CBC, transfuse prn  EBL 500ml intraop  -hgb 13.9-->12.7 postop, no indication for transfusion

## 2021-06-30 NOTE — CONSULT NOTE ADULT - SUBJECTIVE AND OBJECTIVE BOX
Luciana Walker MD  Pager [phone]    HPI:  Pt is a 70 y.o. male with h/o HTN, DM-2, hld, stones, BPH with LURT symptoms (urinary frequency/urgency/nocturia), bladder diverticuli, admitted for robotic assisted suprapubic prostatectomy with bladder diverticulectomy on 6/29.  Patient seen on POD#1, on CBI, denies chest pain/sob/dizziness, denies h/o UTI or stone in bladder, reports L renal stone.     PAST MEDICAL & SURGICAL HISTORY:  HTN (hypertension)    Hypercholesterolemia    Diabetes mellitus  x 10 years FS this am 160    Arthritis  s/p Bilateral TKR    Kidney stones  Per pt Left ; as per pt ; being monitored    S/P total knee replacement  Bilateral 8/17/2020        Review of Systems:   CONSTITUTIONAL: No fever, weight loss, or fatigue  EYES: No eye pain, visual disturbances, or discharge  ENMT:  No difficulty hearing, tinnitus, vertigo; No sinus or throat pain  NECK: No pain or stiffness  BREASTS: No pain, masses, or nipple discharge  RESPIRATORY: No cough, wheezing, chills or hemoptysis; No shortness of breath  CARDIOVASCULAR: No chest pain, palpitations, dizziness, or leg swelling  GASTROINTESTINAL: No abdominal or epigastric pain. No nausea, vomiting, or hematemesis; No diarrhea or constipation. No melena or hematochezia.  GENITOURINARY: No dysuria, +frequency,  No hematuria, or incontinence  NEUROLOGICAL: No headaches, memory loss, loss of strength, numbness, or tremors  SKIN: No itching, burning, rashes, or lesions   LYMPH NODES: No enlarged glands  ENDOCRINE: No heat or cold intolerance; No hair loss  MUSCULOSKELETAL:  h/o TKR,  No muscle, back, or extremity pain  PSYCHIATRIC: No depression, anxiety, mood swings, or difficulty sleeping  HEME/LYMPH: No easy bruising, or bleeding gums  ALLERY AND IMMUNOLOGIC: No hives or eczema    Allergies    No Known Allergies    Intolerances    Social History: drinks wine 2-4 times/month, former smoker 1.5-2 ppd x 20 years, quit 30 years ago    FAMILY HISTORY:  FH: diabetes mellitus (Father, Grandparent, Aunt)      Home Medications:  benazepril-hydrochlorothiazide 20 mg-25 mg oral tablet: 1 tab(s) orally once a day (29 Jun 2021 11:46)  glimepiride 1 mg oral tablet: 1 tab(s) orally once a day (29 Jun 2021 11:46)  Janumet 50 mg-1000 mg oral tablet: 1 tab(s) orally 2 times a day (29 Jun 2021 11:46)  Jardiance 10 mg oral tablet: 1 tab(s) orally once a day (in the morning) (29 Jun 2021 11:46)  NIFEdipine 30 mg oral tablet, extended release: 1 tab(s) orally once a day (29 Jun 2021 11:46)  rosuvastatin 20 mg oral tablet: 1 tab(s) orally once a day (29 Jun 2021 11:46)      MEDICATIONS  (STANDING):  atorvastatin 80 milliGRAM(s) Oral at bedtime  ceFAZolin   IVPB 3000 milliGRAM(s) IV Intermittent every 8 hours  dextrose 40% Gel 15 Gram(s) Oral once  dextrose 5% + sodium chloride 0.45%. 1000 milliLiter(s) (75 mL/Hr) IV Continuous <Continuous>  dextrose 5%. 1000 milliLiter(s) (50 mL/Hr) IV Continuous <Continuous>  dextrose 5%. 1000 milliLiter(s) (100 mL/Hr) IV Continuous <Continuous>  dextrose 50% Injectable 25 Gram(s) IV Push once  dextrose 50% Injectable 12.5 Gram(s) IV Push once  dextrose 50% Injectable 25 Gram(s) IV Push once  glucagon  Injectable 1 milliGRAM(s) IntraMuscular once  heparin   Injectable 5000 Unit(s) SubCutaneous every 8 hours  hydrochlorothiazide 25 milliGRAM(s) Oral daily  insulin lispro (ADMELOG) corrective regimen sliding scale   SubCutaneous three times a day before meals  insulin lispro (ADMELOG) corrective regimen sliding scale   SubCutaneous at bedtime  lidocaine 5% Ointment 1 Application(s) Topical every 3 hours  lisinopril 20 milliGRAM(s) Oral daily  NIFEdipine XL 30 milliGRAM(s) Oral daily  senna 2 Tablet(s) Oral at bedtime    MEDICATIONS  (PRN):  acetaminophen   Tablet .. 650 milliGRAM(s) Oral every 6 hours PRN Mild Pain (1 - 3)  metoclopramide Injectable 10 milliGRAM(s) IV Push every 6 hours PRN Nausea and/or Vomiting  oxybutynin 5 milliGRAM(s) Oral three times a day PRN Bladder spasms  oxyCODONE    IR 5 milliGRAM(s) Oral every 4 hours PRN Moderate Pain (4 - 6)  oxyCODONE    IR 10 milliGRAM(s) Oral every 4 hours PRN Severe Pain (7 - 10)      Vital Signs Last 24 Hrs  T(C): 37.3 (30 Jun 2021 08:26), Max: 37.3 (30 Jun 2021 08:26)  T(F): 99.1 (30 Jun 2021 08:26), Max: 99.1 (30 Jun 2021 08:26)  HR: 85 (30 Jun 2021 08:26) (79 - 93)  BP: 131/65 (30 Jun 2021 08:26) (123/73 - 160/81)  BP(mean): 89 (29 Jun 2021 23:15) (81 - 101)  RR: 20 (30 Jun 2021 08:26) (11 - 25)  SpO2: 96% (30 Jun 2021 08:26) (92% - 100%)  CAPILLARY BLOOD GLUCOSE      POCT Blood Glucose.: 187 mg/dL (30 Jun 2021 11:27)  POCT Blood Glucose.: 157 mg/dL (30 Jun 2021 07:20)  POCT Blood Glucose.: 204 mg/dL (29 Jun 2021 23:09)  POCT Blood Glucose.: 195 mg/dL (29 Jun 2021 19:50)    I&O's Summary    29 Jun 2021 07:01  -  30 Jun 2021 07:00  --------------------------------------------------------  IN: 1450 mL / OUT: 35 mL / NET: 1415 mL    30 Jun 2021 07:01  -  30 Jun 2021 12:05  --------------------------------------------------------  IN: 480 mL / OUT: 0 mL / NET: 480 mL        PHYSICAL EXAM:  GENERAL: NAD, well-developed  HEAD:  Atraumatic, Normocephalic  EYES: EOMI, PERRLA, conjunctiva and sclera clear  NECK: Supple, No JVD  CHEST/LUNG: Clear to auscultation bilaterally; No wheeze  HEART: Regular rate and rhythm; No murmurs, rubs, or gallops  ABDOMEN: Soft, incisional tenderness, R HAYDER drain serosanguinous  ; tracy/CBI postop hematuria  EXTREMITIES:  2+ Peripheral Pulses, No clubbing, cyanosis, or edema  PSYCH: AAOx3  NEUROLOGY: non-focal  SKIN: No rashes or lesions    LABS:                        12.7   10.34 )-----------( 224      ( 30 Jun 2021 07:04 )             39.5     06-30    141  |  103  |  9   ----------------------------<  170<H>  3.7   |  24  |  0.55    Ca    8.9      30 Jun 2021 07:04    Microbiology Culture Results:   No growth (06-25-21 @ 11:00)    RADIOLOGY & ADDITIONAL TESTS:    Imaging Personally Reviewed:  < from: CT Renal Stone Hunt (05.25.21 @ 18:59) >  KIDNEYS/URETERS: There is an 8 x 6 mm nonobstructing stone in the posterior upper pole the left kidney with minimal overlying cortical scarring. There are multiple right renal cysts measuring up to 5.0 cm. Otherwise, the kidneys are symmetric in appearance on this unenhanced scan with no evidence of hydronephrosis or perinephric stranding bilaterally.    No ureteral or bladder stones are identified.    There are multiple bladder wall diverticula measuring up to 5.6 cm along the left posterolateral wall. There is subtle concentric bladder wall thickening. The prostate is markedly enlarged with median lobe hypertrophy invaginating the bladder base.    BOWEL: Diverticulosis of the sigmoid colon. No bowel obstruction. Appendix is normal.  PERITONEUM: No ascites.  VESSELS: Within normal limits.  RETROPERITONEUM/LYMPH NODES: No lymphadenopathy.  ABDOMINAL WALL: Within normal limits.  BONES: Within normal limits.    IMPRESSION: Nonobstructing left renal stone. Mildly enlarged prostate with multiple bladder diverticula, compatible with chronic bladder outlet obstruction.    Consultant(s) Notes Reviewed:      Care Discussed with Consultants/Other Providers: urology DIANNA Guzman, s/p SPP, on CBI, stable  
CHIEF COMPLAINT: s/p gu surgery    HISTORY OF PRESENT ILLNESS:  Pt is a 70 y.o. male with HTN, HLD, DM s/p Robotic Assisted Laparoscopic Suprapubic Prostatectomy with Bladder Diverticulectomy Cystoscopy   denies any cp/sob/palps/dizziness      Allergies    No Known Allergies    Intolerances    	    MEDICATIONS:  heparin   Injectable 5000 Unit(s) SubCutaneous every 8 hours  hydrochlorothiazide 25 milliGRAM(s) Oral daily  lisinopril 20 milliGRAM(s) Oral daily  NIFEdipine XL 30 milliGRAM(s) Oral daily    ceFAZolin   IVPB 3000 milliGRAM(s) IV Intermittent every 8 hours      acetaminophen   Tablet .. 650 milliGRAM(s) Oral every 6 hours PRN  metoclopramide Injectable 10 milliGRAM(s) IV Push every 6 hours PRN  oxyCODONE    IR 5 milliGRAM(s) Oral every 4 hours PRN  oxyCODONE    IR 10 milliGRAM(s) Oral every 4 hours PRN    senna 2 Tablet(s) Oral at bedtime    atorvastatin 80 milliGRAM(s) Oral at bedtime  dextrose 40% Gel 15 Gram(s) Oral once  dextrose 50% Injectable 25 Gram(s) IV Push once  dextrose 50% Injectable 12.5 Gram(s) IV Push once  dextrose 50% Injectable 25 Gram(s) IV Push once  glucagon  Injectable 1 milliGRAM(s) IntraMuscular once  insulin lispro (ADMELOG) corrective regimen sliding scale   SubCutaneous three times a day before meals  insulin lispro (ADMELOG) corrective regimen sliding scale   SubCutaneous at bedtime    dextrose 5% + sodium chloride 0.45%. 1000 milliLiter(s) IV Continuous <Continuous>  dextrose 5%. 1000 milliLiter(s) IV Continuous <Continuous>  dextrose 5%. 1000 milliLiter(s) IV Continuous <Continuous>  lidocaine 5% Ointment 1 Application(s) Topical every 3 hours  oxybutynin 5 milliGRAM(s) Oral three times a day PRN      PAST MEDICAL & SURGICAL HISTORY:  HTN (hypertension)    Hypercholesterolemia    Diabetes mellitus  x 10 years FS this am 160    Arthritis  s/p Bilateral TKR    Kidney stones  Per pt Left ; as per pt ; being monitored    S/P total knee replacement  Bilateral 8/17/2020        FAMILY HISTORY:  FH: diabetes mellitus (Father, Grandparent, Aunt)        SOCIAL HISTORY:    non smoker. indep in adl      REVIEW OF SYSTEMS:  See HPI, otherwise complete 10 point review of systems negative    [ ] All others negative	      PHYSICAL EXAM:  T(C): 37.2 (06-30-21 @ 06:12), Max: 37.2 (06-30-21 @ 06:12)  HR: 89 (06-30-21 @ 06:12) (79 - 93)  BP: 125/85 (06-30-21 @ 06:12) (123/73 - 160/81)  RR: 20 (06-30-21 @ 06:12) (11 - 25)  SpO2: 99% (06-30-21 @ 06:12) (92% - 100%)  Wt(kg): --  I&O's Summary    29 Jun 2021 07:01  -  30 Jun 2021 07:00  --------------------------------------------------------  IN: 1450 mL / OUT: 35 mL / NET: 1415 mL        Appearance: No Acute Distress	  HEENT:  Normal oral mucosa, PERRL, EOMI	  Cardiovascular: Normal S1 S2, No JVD, No murmurs/rubs/gallops  Respiratory: Lungs clear to auscultation bilaterally  Gastrointestinal:  Soft, Non-tender, + BS	  Skin: No rashes, No ecchymoses, No cyanosis	  Neurologic: Non-focal  Extremities: No clubbing, cyanosis or edema  Vascular: Peripheral pulses palpable 2+ bilaterally  Psychiatry: A & O x 3, Mood & affect appropriate    Laboratory Data:	 	    CBC Full  -  ( 30 Jun 2021 07:04 )  WBC Count : 10.34 K/uL  Hemoglobin : 12.7 g/dL  Hematocrit : 39.5 %  Platelet Count - Automated : 224 K/uL  Mean Cell Volume : 88.6 fL  Mean Cell Hemoglobin : 28.5 pg  Mean Cell Hemoglobin Concentration : 32.2 gm/dL  Auto Neutrophil # : 7.56 K/uL  Auto Lymphocyte # : 1.57 K/uL  Auto Monocyte # : 1.10 K/uL  Auto Eosinophil # : 0.04 K/uL  Auto Basophil # : 0.03 K/uL  Auto Neutrophil % : 73.1 %  Auto Lymphocyte % : 15.2 %  Auto Monocyte % : 10.6 %  Auto Eosinophil % : 0.4 %  Auto Basophil % : 0.3 %    06-30    141  |  103  |  9   ----------------------------<  170<H>  3.7   |  24  |  0.55  06-29    141  |  103  |  9   ----------------------------<  230<H>  3.9   |  24  |  0.51    Ca    8.9      30 Jun 2021 07:04  Ca    8.9      29 Jun 2021 19:41        proBNP:   Lipid Profile:   HgA1c:   TSH:       CARDIAC MARKERS:  	    ECG:  	nsr no st/t changes  RADIOLOGY:  OTHER: 	    PREVIOUS DIAGNOSTIC TESTING:    [ ] Echocardiogram:  [ ] Catheterization:  [ ] Stress Test:  	    Assessment:  periop cardiac management  HTN  HLD  DM  s/p Laparoscopic Suprapubic Prostatectomy with Bladder Diverticulectomy Cystoscopy     Recs:  cardiac status appears stable  no postop cardiac events noted  continue with excellent postop care per   bp stable on current antihypertensives  consider initiation of aspirin in future/outpatient once hemturia resolves given elevated ascvd risk  dvt ppx        Greater than 50 minutes spent on total encounter; more than 50% of the visit was spent counseling and/or coordinating care by the attending physician.   	  Zaki Crowley MD   Cardiovascular Diseases  [phone]

## 2021-06-30 NOTE — CONSULT NOTE ADULT - PROBLEM SELECTOR RECOMMENDATION 3
at home takes nifedipine and benazepril/hctz  c/w home meds, BP controlled  -cardiology inputs appreciated, pt had echo 4/26/21 normal LVEF 57%, preop EKG NSR

## 2021-06-30 NOTE — CONSULT NOTE ADULT - ASSESSMENT
70 y.o. male with h/o HTN, DM-2, hld, left renal stones, BPH with LURT symptoms s/p robotic assisted lap suprapubic prostatectomy with bladder diverticulectomy on 6/29, postop on CBI

## 2021-07-01 ENCOUNTER — TRANSCRIPTION ENCOUNTER (OUTPATIENT)
Age: 71
End: 2021-07-01

## 2021-07-01 VITALS
OXYGEN SATURATION: 90 % | DIASTOLIC BLOOD PRESSURE: 75 MMHG | SYSTOLIC BLOOD PRESSURE: 144 MMHG | RESPIRATION RATE: 18 BRPM | TEMPERATURE: 98 F | HEART RATE: 100 BPM

## 2021-07-01 LAB
GLUCOSE BLDC GLUCOMTR-MCNC: 190 MG/DL — HIGH (ref 70–99)
GLUCOSE BLDC GLUCOMTR-MCNC: 224 MG/DL — HIGH (ref 70–99)

## 2021-07-01 PROCEDURE — 99232 SBSQ HOSP IP/OBS MODERATE 35: CPT

## 2021-07-01 RX ORDER — LIDOCAINE 4 G/100G
10 CREAM TOPICAL
Qty: 15 | Refills: 0
Start: 2021-07-01

## 2021-07-01 RX ORDER — POLYETHYLENE GLYCOL 3350 17 G/17G
1 POWDER, FOR SOLUTION ORAL
Qty: 0 | Refills: 0 | DISCHARGE

## 2021-07-01 RX ORDER — OXYCODONE HYDROCHLORIDE 5 MG/1
1 TABLET ORAL
Qty: 11 | Refills: 0
Start: 2021-07-01

## 2021-07-01 RX ORDER — ACETAMINOPHEN 500 MG
2 TABLET ORAL
Qty: 0 | Refills: 0 | DISCHARGE
Start: 2021-07-01

## 2021-07-01 RX ORDER — OXYBUTYNIN CHLORIDE 5 MG
1 TABLET ORAL
Qty: 15 | Refills: 0
Start: 2021-07-01

## 2021-07-01 RX ORDER — SENNA PLUS 8.6 MG/1
2 TABLET ORAL
Qty: 0 | Refills: 0 | DISCHARGE
Start: 2021-07-01

## 2021-07-01 RX ADMIN — LISINOPRIL 20 MILLIGRAM(S): 2.5 TABLET ORAL at 05:04

## 2021-07-01 RX ADMIN — Medication 100 MILLIGRAM(S): at 02:44

## 2021-07-01 RX ADMIN — SODIUM CHLORIDE 75 MILLILITER(S): 9 INJECTION, SOLUTION INTRAVENOUS at 09:46

## 2021-07-01 RX ADMIN — Medication 25 MILLIGRAM(S): at 05:04

## 2021-07-01 RX ADMIN — Medication 30 MILLIGRAM(S): at 05:04

## 2021-07-01 RX ADMIN — Medication 5 MILLIGRAM(S): at 00:31

## 2021-07-01 RX ADMIN — HEPARIN SODIUM 5000 UNIT(S): 5000 INJECTION INTRAVENOUS; SUBCUTANEOUS at 05:03

## 2021-07-01 RX ADMIN — Medication 1: at 07:51

## 2021-07-01 RX ADMIN — Medication 200 MILLIGRAM(S): at 10:48

## 2021-07-01 RX ADMIN — Medication 2: at 11:52

## 2021-07-01 NOTE — PROGRESS NOTE ADULT - SUBJECTIVE AND OBJECTIVE BOX
Dr. Luciana Walker  Pager [phone]    PROGRESS NOTE:     Patient is a 70y old  Male who presents with a chief complaint of Removal of prostate and bladder diverticulum (01 Jul 2021 10:29)      SUBJECTIVE / OVERNIGHT EVENTS: denies chest pain/sob, CBI clamped  ADDITIONAL REVIEW OF SYSTEMS: afebrile    MEDICATIONS  (STANDING):  atorvastatin 80 milliGRAM(s) Oral at bedtime  ceFAZolin   IVPB 3000 milliGRAM(s) IV Intermittent every 8 hours  dextrose 40% Gel 15 Gram(s) Oral once  dextrose 5% + sodium chloride 0.45%. 1000 milliLiter(s) (75 mL/Hr) IV Continuous <Continuous>  dextrose 5%. 1000 milliLiter(s) (50 mL/Hr) IV Continuous <Continuous>  dextrose 5%. 1000 milliLiter(s) (100 mL/Hr) IV Continuous <Continuous>  dextrose 50% Injectable 25 Gram(s) IV Push once  dextrose 50% Injectable 12.5 Gram(s) IV Push once  dextrose 50% Injectable 25 Gram(s) IV Push once  glucagon  Injectable 1 milliGRAM(s) IntraMuscular once  heparin   Injectable 5000 Unit(s) SubCutaneous every 8 hours  hydrochlorothiazide 25 milliGRAM(s) Oral daily  insulin lispro (ADMELOG) corrective regimen sliding scale   SubCutaneous three times a day before meals  insulin lispro (ADMELOG) corrective regimen sliding scale   SubCutaneous at bedtime  lidocaine 5% Ointment 1 Application(s) Topical every 3 hours  lisinopril 20 milliGRAM(s) Oral daily  NIFEdipine XL 30 milliGRAM(s) Oral daily  senna 2 Tablet(s) Oral at bedtime    MEDICATIONS  (PRN):  acetaminophen   Tablet .. 650 milliGRAM(s) Oral every 6 hours PRN Mild Pain (1 - 3)  metoclopramide Injectable 10 milliGRAM(s) IV Push every 6 hours PRN Nausea and/or Vomiting  oxybutynin 5 milliGRAM(s) Oral three times a day PRN Bladder spasms  oxyCODONE    IR 5 milliGRAM(s) Oral every 4 hours PRN Moderate Pain (4 - 6)  oxyCODONE    IR 10 milliGRAM(s) Oral every 4 hours PRN Severe Pain (7 - 10)      CAPILLARY BLOOD GLUCOSE      POCT Blood Glucose.: 190 mg/dL (01 Jul 2021 07:14)  POCT Blood Glucose.: 195 mg/dL (30 Jun 2021 22:26)  POCT Blood Glucose.: 189 mg/dL (30 Jun 2021 16:39)  POCT Blood Glucose.: 187 mg/dL (30 Jun 2021 11:27)    I&O's Summary    30 Jun 2021 07:01  -  01 Jul 2021 07:00  --------------------------------------------------------  IN: 1635 mL / OUT: 62.5 mL / NET: 1572.5 mL    01 Jul 2021 07:01  -  01 Jul 2021 11:01  --------------------------------------------------------  IN: 480 mL / OUT: 1805 mL / NET: -1325 mL        PHYSICAL EXAM:  Vital Signs Last 24 Hrs  T(C): 37.2 (01 Jul 2021 09:16), Max: 37.4 (01 Jul 2021 05:03)  T(F): 98.9 (01 Jul 2021 09:16), Max: 99.3 (01 Jul 2021 05:03)  HR: 83 (01 Jul 2021 09:16) (83 - 90)  BP: 136/76 (01 Jul 2021 09:16) (130/76 - 167/71)  BP(mean): --  RR: 18 (01 Jul 2021 09:16) (18 - 18)  SpO2: 97% (01 Jul 2021 09:16) (96% - 97%)  GENERAL: NAD, well-developed  HEAD:  Atraumatic, Normocephalic  EYES: EOMI, PERRLA, conjunctiva and sclera clear  NECK: Supple, No JVD  CHEST/LUNG: Clear to auscultation bilaterally; No wheeze  HEART: Regular rate and rhythm; No murmurs, rubs, or gallops  ABDOMEN: Soft, incisional tenderness, R HAYDER drain serosanguinous  ; tracy/CBI clamped pink urine  EXTREMITIES:  2+ Peripheral Pulses, No clubbing, cyanosis, or edema  PSYCH: AAOx3  NEUROLOGY: non-focal  SKIN: No rashes or lesions    LABS:                        12.7   10.34 )-----------( 224      ( 30 Jun 2021 07:04 )             39.5     06-30    141  |  103  |  9   ----------------------------<  170<H>  3.7   |  24  |  0.55    Ca    8.9      30 Jun 2021 07:04        RADIOLOGY & ADDITIONAL TESTS:  Results Reviewed:   Imaging Personally Reviewed:    Electrocardiogram Personally Reviewed:    COORDINATION OF CARE:  Care Discussed with Consultants/Other Providers [Y/N]: urology DIANNA Alaniz dc home today, resume home meds  Prior or Outpatient Records Reviewed [Y/N]:  
POD #1  Afeb 147/77 90 98%RA    Pt has no c/o  Abd- soft NT ND; no flatus     wounds C&D  Manzano/CBI pink  HAYDER 25  
Cardiovascular Disease Progress Note     Overnight events: No acute events overnight.  no new cardiac sx  Otherwise review of systems negative    Objective Findings:  T(C): 37.4 (07-01-21 @ 05:03), Max: 37.4 (07-01-21 @ 05:03)  HR: 85 (07-01-21 @ 05:03) (84 - 90)  BP: 167/71 (07-01-21 @ 05:03) (130/76 - 167/71)  RR: 18 (07-01-21 @ 05:03) (18 - 20)  SpO2: 96% (07-01-21 @ 05:03) (96% - 97%)  Wt(kg): --  Daily     Daily       Physical Exam:  Gen: NAD  HEENT: EOMI  CV: RRR, normal S1 + S2, no m/r/g  Lungs: CTAB  Abd: soft, non-tender  Ext: No edema    Telemetry:    Laboratory Data:                        12.7   10.34 )-----------( 224      ( 30 Jun 2021 07:04 )             39.5     06-30    141  |  103  |  9   ----------------------------<  170<H>  3.7   |  24  |  0.55    Ca    8.9      30 Jun 2021 07:04                Inpatient Medications:  MEDICATIONS  (STANDING):  atorvastatin 80 milliGRAM(s) Oral at bedtime  ceFAZolin   IVPB 3000 milliGRAM(s) IV Intermittent every 8 hours  dextrose 40% Gel 15 Gram(s) Oral once  dextrose 5% + sodium chloride 0.45%. 1000 milliLiter(s) (75 mL/Hr) IV Continuous <Continuous>  dextrose 5%. 1000 milliLiter(s) (50 mL/Hr) IV Continuous <Continuous>  dextrose 5%. 1000 milliLiter(s) (100 mL/Hr) IV Continuous <Continuous>  dextrose 50% Injectable 25 Gram(s) IV Push once  dextrose 50% Injectable 12.5 Gram(s) IV Push once  dextrose 50% Injectable 25 Gram(s) IV Push once  glucagon  Injectable 1 milliGRAM(s) IntraMuscular once  heparin   Injectable 5000 Unit(s) SubCutaneous every 8 hours  hydrochlorothiazide 25 milliGRAM(s) Oral daily  insulin lispro (ADMELOG) corrective regimen sliding scale   SubCutaneous three times a day before meals  insulin lispro (ADMELOG) corrective regimen sliding scale   SubCutaneous at bedtime  lidocaine 5% Ointment 1 Application(s) Topical every 3 hours  lisinopril 20 milliGRAM(s) Oral daily  NIFEdipine XL 30 milliGRAM(s) Oral daily  senna 2 Tablet(s) Oral at bedtime      Assessment:  periop cardiac management  HTN  HLD  DM  s/p Laparoscopic Suprapubic Prostatectomy with Bladder Diverticulectomy Cystoscopy     Recs:  cardiac status appears stable  no postop cardiac events noted  continue with excellent postop care per   bp trend stable on current antihypertensives  consider initiation of aspirin in future/outpatient once hemturia resolves given elevated ascvd risk  dvt ppx  dispo planning          Over 25 minutes spent on total encounter; more than 50% of the visit was spent counseling and/or coordinating care by the attending physician.      Zaki Crowley MD   Cardiovascular Disease  [phone]
Subjective  Patient is omplaining of bladder spasm, no nausea, no vomiting    Objective    Vital signs  T(F): , Max: 98.7 (06-29-21 @ 23:15)  HR: 90 (06-30-21 @ 00:10)  BP: 147/77 (06-30-21 @ 00:10)  SpO2: 98% (06-30-21 @ 00:10)  Wt(kg): --    Output     06-29 @ 07:01  -  06-30 @ 04:23  --------------------------------------------------------  IN: 1225 mL / OUT: 27.5 mL / NET: 1197.5 mL        Gen: no distress observed  Abd: soft, mildly distended, + right-side HAYDER serosanguinous, dressings c/d/i.  There is a pinpoint sutured area in left upper quadrant  : = tracy, traction off, on moderate CBI, light pink    Labs      06-29 @ 19:41    WBC 16.82 / Hct 42.2  / SCr 0.51       Urine Cx: ?  Blood Cx: ?    Imaging
Overnight events:  Pt required manual irrigation x one, no clots, CBI clamped at 4am    Subjective:  Pt offers no complaints, + flatus, no N/V    Objective:    Vital signs  T(C): , Max: 37.4 (07-01-21 @ 05:03)  HR: 85 (07-01-21 @ 05:03)  BP: 167/71 (07-01-21 @ 05:03)  SpO2: 96% (07-01-21 @ 05:03)  Wt(kg): --    Output   Demarcus: pink tinged  HAYDER: 37.5  06-30 @ 07:01  -  07-01 @ 07:00  --------------------------------------------------------  IN: 1635 mL / OUT: 62.5 mL / NET: 1572.5 mL        Gen: NAD  Abd: jing c/d/i, soft, nontender, nondistended  : demarcus secured    Labs: none today                        12.7   10.34 )-----------( 224      ( 30 Jun 2021 07:04 )             39.5     30 Jun 2021 07:04    141    |  103    |  9      ----------------------------<  170    3.7     |  24     |  0.55     Ca    8.9        30 Jun 2021 07:04

## 2021-07-01 NOTE — DISCHARGE NOTE NURSING/CASE MANAGEMENT/SOCIAL WORK - PATIENT PORTAL LINK FT
You can access the FollowMyHealth Patient Portal offered by [facility] by registering at the following website: [email or web address]. By joining [employer]’s FollowMyHealth portal, you will also be able to view your health information using other applications (apps) compatible with our system.

## 2021-07-01 NOTE — DISCHARGE NOTE NURSING/CASE MANAGEMENT/SOCIAL WORK - NSDCPECAREGIVERED_GEN_ALL_CORE
Medline and carenotes for surgical procedure Suprapubic prostatectomy,  Manzano Care, Oxycodone, Diabetes, as well as DC Medications and side effects literature for patient reference./Yes

## 2021-07-01 NOTE — DISCHARGE NOTE NURSING/CASE MANAGEMENT/SOCIAL WORK - NSDCPNINST_GEN_ALL_CORE
Maintain abdominal incisions clean dry and intact, steri strips will fall off on their own in 1-2 weeks. Call MD with any signs of infection ie fever/shaking chills, redness or drainage, or with signs of bleeding or persistent nausea. Continue to drink plenty of fluids and avoid straining and constipation which may be a side effect from taking narcotic pain meds. No heavy lifting greater than 10 pounds (ie a gallon of milk.) Follow-up with MD as well as PMD in office as instructed for continuity of care post-operatively, as per safe DC plan. Maintain Manzano catheter to gravity drainage leg bag as instructed, remember hand washing and infection prevention measures in order to prevent catheter-related-urinary-tract-infections ("CAUTI")  Continue Diabetes management, diet and glucose monitoring, know your A1C blood level and follow up with PMD for continuity of care. Remember hand hygiene, skin inspection for prevention of infection.

## 2021-07-01 NOTE — DISCHARGE NOTE PROVIDER - CARE PROVIDER_API CALL
Sera Davenport)  Urology  [address] [place] [address]  Phone: [phone]  Fax: [phone]  Follow Up Time:

## 2021-07-01 NOTE — DISCHARGE NOTE PROVIDER - NSDCCPCAREPLAN_GEN_ALL_CORE_FT
PRINCIPAL DISCHARGE DIAGNOSIS  Diagnosis: Enlarged prostate  Assessment and Plan of Treatment: Empty tracy bag as needed as instructed.  Keep well hydrated.  No heavy lifting or straining for 4 to 6 weeks, avoid constipation. You may have intermittent pink tinged urine and small amounts of leakage around tracy (due to bladder spasms).  This is normal.   If your urine becomes bright red or with clots, or there is no urine in the bag, please call the office. You may shower, just pat white strips dry, they will fall off in a few weeks. Change dressing at drain site daily or as needed until dry.  Call Dr. Davenport's office to schedule a follow up appointment next week for tracy removal and further management.  Call the office if you have fever greater than 101, no urine in bag, pain not relieved with pain medication, nausea/vomiting.        SECONDARY DISCHARGE DIAGNOSES  Diagnosis: HTN (hypertension)  Assessment and Plan of Treatment: Continue current home medications and follow up with your primary care provider      Diagnosis: Hypercholesterolemia  Assessment and Plan of Treatment: Continue current home medications and follow up with your primary care provider       PRINCIPAL DISCHARGE DIAGNOSIS  Diagnosis: Enlarged prostate  Assessment and Plan of Treatment: Empty tracy bag as needed as instructed.  Keep well hydrated.  No heavy lifting or straining for 4 to 6 weeks, avoid constipation. You may have intermittent pink tinged urine and small amounts of leakage around tracy (due to bladder spasms).  This is normal.   If your urine becomes bright red or with clots, or there is no urine in the bag, please call the office. You may shower, just pat white strips dry, they will fall off in a few weeks. Change dressing at drain site daily or as needed until dry.  Flush tracy gently as instructed if no urine output from tracy.  Call Dr. Davenport's office to schedule a follow up appointment next week for tracy removal and further management.  Call the office if you have fever greater than 101, no urine in bag, pain not relieved with pain medication, nausea/vomiting.        SECONDARY DISCHARGE DIAGNOSES  Diagnosis: HTN (hypertension)  Assessment and Plan of Treatment: Continue current home medications and follow up with your primary care provider      Diagnosis: Hypercholesterolemia  Assessment and Plan of Treatment: Continue current home medications and follow up with your primary care provider

## 2021-07-01 NOTE — PROGRESS NOTE ADULT - PROBLEM SELECTOR PLAN 1
-h/o BPH, bladder diverticuli, L renal stone  -s/p p robotic assisted lap suprapubic prostatectomy with bladder diverticulectomy on 6/29  -postop management per , IVF, pain control, CBI  -HAYDER creat 0.53 = serum creat --> no leak, HAYDER dc'd pre-discharge  -dc home today with tracy per primary team

## 2021-07-01 NOTE — DISCHARGE NOTE PROVIDER - NSDCMRMEDTOKEN_GEN_ALL_CORE_FT
acetaminophen 325 mg oral tablet: 3 tablets every 6 hours as needed for mild to moderate pain  benazepril-hydrochlorothiazide 20 mg-25 mg oral tablet: 1 tab(s) orally once a day  glimepiride 1 mg oral tablet: 1 tab(s) orally once a day  Janumet 50 mg-1000 mg oral tablet: 1 tab(s) orally 2 times a day  Jardiance 10 mg oral tablet: 1 tab(s) orally once a day (in the morning)  lidocaine 2% topical gel with applicator: Apply a small amount to tip of penis 4 times a day as needed for catheter irritation  MiraLax oral powder for reconstitution: 1 packet(s) orally once a day as needed for constipation  NIFEdipine 30 mg oral tablet, extended release: 1 tab(s) orally once a day  oxybutynin 5 mg oral tablet: 1 tablet 3 times a day, As needed, for Bladder spasms, stop taking the day before your follow up appointment with Dr. Davenport  oxyCODONE 5 mg oral tablet: 1 tablet every 6 hours as needed for severe pain MDD:4  rosuvastatin 20 mg oral tablet: 1 tab(s) orally once a day  senna oral tablet: 2 tab(s) orally once a day (at bedtime)

## 2021-07-01 NOTE — DISCHARGE NOTE PROVIDER - HOSPITAL COURSE
71 yo M s/p RAL SPP/ bladder diverticulectomy 6/29/2021    POD#1: CBI pink; no events overnight, HAYDER Cr = serum  POD#2: CBI clamped early this AM, urine pink tinged, tolerating CLD, + flatus, no N/V, diet advanced without incident, pain controlled, ambulating, urine remained acceptable in color, HAYDER d/c and pt d/c with tracy to f/u with Dr. Davenport. 69 yo M s/p RAL SPP/ bladder diverticulectomy 6/29/2021    POD#1: CBI pink; no events overnight, HAYDER Cr = serum  POD#2: CBI clamped early this AM, urine pink tinged, tolerating CLD, + flatus, no N/V, diet advanced without incident, pain controlled, ambulating, urine remained acceptable in color, HAYDER d/c and pt d/c with tracy to f/u with Dr. Davenport.   I-stop checked.

## 2021-07-01 NOTE — DISCHARGE NOTE NURSING/CASE MANAGEMENT/SOCIAL WORK - NSDPDISTO_GEN_ALL_CORE
Pt  with incisions CDI, steri strips to scope sites, VS stable Afebrile. Manzano to SGD leg bag drainage. DSD to previous HAYDER site. pt with positive bowel sounds geovanna po diet. Seen by MD and cleared for DC to Home as per safe DC plan./Home

## 2021-07-01 NOTE — PROGRESS NOTE ADULT - ASSESSMENT
s/p RAL SPP/ bladder diverticulectomy  -Pain management  -IVF  -Labs in AM  -Monitor urine color  -HAYDER creatinine in AM
s/p RAL SPP/ bladder diverticulectomy  POD#1 - CBI pink; no events overnite  Plan:  - cont CBI  -Pain management  -Labs  -Monitor urine color  -HAYDER currie
69 yo M s/p RAL SPP/ bladder diverticulectomy 6/29/2021    POD#1 - CBI pink; no events overnight, HAYDER Cr = serum  POD #2: CBI clamped early this AM, urine pink tinged, tolerating CLD, + flatus, no N/V    Plan:  - monitor color  - may advance diet to regular  - Pain management  - tracy/leg bag teaching  - f/u medicine  - d/c HAYDER prior to d/c  - DVT prophy, IS, OOB, ambulate  
70 y.o. male with h/o HTN, DM-2, hld, left renal stones, BPH with LURT symptoms s/p robotic assisted lap suprapubic prostatectomy with bladder diverticulectomy on 6/29, postop CBI

## 2021-07-01 NOTE — DISCHARGE NOTE NURSING/CASE MANAGEMENT/SOCIAL WORK - NSDCPEPT PROEDMA_GEN_ALL_CORE
Isaac for patient verifying if she has gone for any labs since December  Advised to call the office he she would like to switch her appt to a virtual or video appointment  Yes

## 2021-07-02 PROBLEM — E11.9 TYPE 2 DIABETES MELLITUS WITHOUT COMPLICATIONS: Chronic | Status: ACTIVE | Noted: 2021-06-25

## 2021-07-02 PROBLEM — E78.00 PURE HYPERCHOLESTEROLEMIA, UNSPECIFIED: Chronic | Status: ACTIVE | Noted: 2021-06-25

## 2021-07-02 PROBLEM — N20.0 CALCULUS OF KIDNEY: Chronic | Status: ACTIVE | Noted: 2021-06-25

## 2021-07-02 PROBLEM — M19.90 UNSPECIFIED OSTEOARTHRITIS, UNSPECIFIED SITE: Chronic | Status: ACTIVE | Noted: 2021-06-25

## 2021-07-02 PROBLEM — I10 ESSENTIAL (PRIMARY) HYPERTENSION: Chronic | Status: ACTIVE | Noted: 2021-06-25

## 2021-07-02 LAB — SURGICAL PATHOLOGY STUDY: SIGNIFICANT CHANGE UP

## 2021-07-06 ENCOUNTER — APPOINTMENT (OUTPATIENT)
Dept: UROLOGY | Facility: CLINIC | Age: 71
End: 2021-07-06
Payer: MEDICARE

## 2021-07-06 PROCEDURE — 99024 POSTOP FOLLOW-UP VISIT: CPT

## 2021-07-06 NOTE — HISTORY OF PRESENT ILLNESS
[FreeTextEntry1] : 71yo gentleman with cc of luts, BPH and bladder diverticulum. At baseline, pt reports urinary frequency and slow urinary stream. he is going every hour during the day. He is waking up 4-5 times per night to void. He endorses straining. He has occasional urgency. No urinary incontinence. He drinks a lot of water and 1-2 espresso every am. No hx of UTI. Has had hx of retention x1 about 10y ago. Has not been on meds for this until recently starting saw palmetto. Hx of kidney stones s/p ESWL. No bladder stones. No abd surgery. Reviewed imaging, pt with large L bladder tic and enlarged prostate with very significant median lobe. Last week he underwent uncomplicated RAL diverticulectomy and SP suprapubic prostatectomy. \par \par He is overall feeling well. No pain--taking only tylenol. Having BM. Ambulating. Path was benign. \par \par

## 2021-07-06 NOTE — ASSESSMENT
[FreeTextEntry1] : S/p robotic SPP with diverticulectomy. Doing well. tracy removed today. \par --Bactrim ppx\par --RTC in 3 weeks for sx check\par

## 2021-07-06 NOTE — PHYSICAL EXAM
[General Appearance - Well Nourished] : well nourished [General Appearance - Well Developed] : well developed [General Appearance - In No Acute Distress] : no acute distress [Abdomen Soft] : soft [Costovertebral Angle Tenderness] : no ~M costovertebral angle tenderness [Abdomen Tenderness] : non-tender [Edema] : no peripheral edema [Exaggerated Use Of Accessory Muscles For Inspiration] : no accessory muscle use [Normal Station and Gait] : the gait and station were normal for the patient's age [Oriented To Time, Place, And Person] : oriented to person, place, and time [No Focal Deficits] : no focal deficits [FreeTextEntry1] : incisions healing well

## 2021-07-17 ENCOUNTER — NON-APPOINTMENT (OUTPATIENT)
Age: 71
End: 2021-07-17

## 2021-07-18 ENCOUNTER — NON-APPOINTMENT (OUTPATIENT)
Age: 71
End: 2021-07-18

## 2021-07-18 ENCOUNTER — INPATIENT (INPATIENT)
Facility: HOSPITAL | Age: 71
LOS: 4 days | Discharge: HOME | End: 2021-07-23
Attending: UROLOGY | Admitting: UROLOGY
Payer: MEDICARE

## 2021-07-18 VITALS
TEMPERATURE: 102 F | OXYGEN SATURATION: 97 % | HEIGHT: 73 IN | SYSTOLIC BLOOD PRESSURE: 149 MMHG | RESPIRATION RATE: 18 BRPM | HEART RATE: 105 BPM | DIASTOLIC BLOOD PRESSURE: 79 MMHG

## 2021-07-18 DIAGNOSIS — R10.9 UNSPECIFIED ABDOMINAL PAIN: ICD-10-CM

## 2021-07-18 DIAGNOSIS — Z96.659 PRESENCE OF UNSPECIFIED ARTIFICIAL KNEE JOINT: Chronic | ICD-10-CM

## 2021-07-18 LAB
ALBUMIN SERPL ELPH-MCNC: 4.2 G/DL — SIGNIFICANT CHANGE UP (ref 3.3–5)
ALP SERPL-CCNC: 50 U/L — SIGNIFICANT CHANGE UP (ref 40–120)
ALT FLD-CCNC: 26 U/L — SIGNIFICANT CHANGE UP (ref 4–41)
ANION GAP SERPL CALC-SCNC: 21 MMOL/L — HIGH (ref 7–14)
APPEARANCE UR: ABNORMAL
AST SERPL-CCNC: 40 U/L — SIGNIFICANT CHANGE UP (ref 4–40)
BACTERIA # UR AUTO: ABNORMAL
BASOPHILS # BLD AUTO: 0 K/UL — SIGNIFICANT CHANGE UP (ref 0–0.2)
BASOPHILS NFR BLD AUTO: 0 % — SIGNIFICANT CHANGE UP (ref 0–2)
BILIRUB SERPL-MCNC: 0.6 MG/DL — SIGNIFICANT CHANGE UP (ref 0.2–1.2)
BILIRUB UR-MCNC: NEGATIVE — SIGNIFICANT CHANGE UP
BLOOD GAS VENOUS COMPREHENSIVE RESULT: SIGNIFICANT CHANGE UP
BUN SERPL-MCNC: 9 MG/DL — SIGNIFICANT CHANGE UP (ref 7–23)
CALCIUM SERPL-MCNC: 9.3 MG/DL — SIGNIFICANT CHANGE UP (ref 8.4–10.5)
CHLORIDE SERPL-SCNC: 95 MMOL/L — LOW (ref 98–107)
CO2 SERPL-SCNC: 17 MMOL/L — LOW (ref 22–31)
COLOR SPEC: YELLOW — SIGNIFICANT CHANGE UP
CREAT SERPL-MCNC: 0.51 MG/DL — SIGNIFICANT CHANGE UP (ref 0.5–1.3)
DIFF PNL FLD: ABNORMAL
EOSINOPHIL # BLD AUTO: 0 K/UL — SIGNIFICANT CHANGE UP (ref 0–0.5)
EOSINOPHIL NFR BLD AUTO: 0 % — SIGNIFICANT CHANGE UP (ref 0–6)
EPI CELLS # UR: 4 /HPF — SIGNIFICANT CHANGE UP (ref 0–5)
GLUCOSE BLDC GLUCOMTR-MCNC: 185 MG/DL — HIGH (ref 70–99)
GLUCOSE SERPL-MCNC: 226 MG/DL — HIGH (ref 70–99)
GLUCOSE UR QL: ABNORMAL
HCT VFR BLD CALC: 42 % — SIGNIFICANT CHANGE UP (ref 39–50)
HGB BLD-MCNC: 13.6 G/DL — SIGNIFICANT CHANGE UP (ref 13–17)
HYALINE CASTS # UR AUTO: 0 /LPF — SIGNIFICANT CHANGE UP (ref 0–7)
IANC: 18.9 K/UL — HIGH (ref 1.5–8.5)
KETONES UR-MCNC: ABNORMAL
LEUKOCYTE ESTERASE UR-ACNC: ABNORMAL
LYMPHOCYTES # BLD AUTO: 0.9 K/UL — LOW (ref 1–3.3)
LYMPHOCYTES # BLD AUTO: 4 % — LOW (ref 13–44)
MCHC RBC-ENTMCNC: 28.3 PG — SIGNIFICANT CHANGE UP (ref 27–34)
MCHC RBC-ENTMCNC: 32.4 GM/DL — SIGNIFICANT CHANGE UP (ref 32–36)
MCV RBC AUTO: 87.3 FL — SIGNIFICANT CHANGE UP (ref 80–100)
MONOCYTES # BLD AUTO: 2.91 K/UL — HIGH (ref 0–0.9)
MONOCYTES NFR BLD AUTO: 13 % — SIGNIFICANT CHANGE UP (ref 2–14)
NEUTROPHILS # BLD AUTO: 18.37 K/UL — HIGH (ref 1.8–7.4)
NEUTROPHILS NFR BLD AUTO: 77 % — SIGNIFICANT CHANGE UP (ref 43–77)
NITRITE UR-MCNC: NEGATIVE — SIGNIFICANT CHANGE UP
PH UR: 6 — SIGNIFICANT CHANGE UP (ref 5–8)
PLATELET # BLD AUTO: 297 K/UL — SIGNIFICANT CHANGE UP (ref 150–400)
POTASSIUM SERPL-MCNC: SIGNIFICANT CHANGE UP MMOL/L (ref 3.5–5.3)
POTASSIUM SERPL-SCNC: SIGNIFICANT CHANGE UP MMOL/L (ref 3.5–5.3)
PROT SERPL-MCNC: 8.3 G/DL — SIGNIFICANT CHANGE UP (ref 6–8.3)
PROT UR-MCNC: ABNORMAL
RBC # BLD: 4.81 M/UL — SIGNIFICANT CHANGE UP (ref 4.2–5.8)
RBC # FLD: 13.2 % — SIGNIFICANT CHANGE UP (ref 10.3–14.5)
RBC CASTS # UR COMP ASSIST: 5 /HPF — HIGH (ref 0–4)
SARS-COV-2 RNA SPEC QL NAA+PROBE: SIGNIFICANT CHANGE UP
SODIUM SERPL-SCNC: 133 MMOL/L — LOW (ref 135–145)
SP GR SPEC: 1.03 — HIGH (ref 1.01–1.02)
UROBILINOGEN FLD QL: SIGNIFICANT CHANGE UP
WBC # BLD: 22.4 K/UL — HIGH (ref 3.8–10.5)
WBC # FLD AUTO: 22.4 K/UL — HIGH (ref 3.8–10.5)
WBC UR QL: >50 /HPF — HIGH (ref 0–5)

## 2021-07-18 PROCEDURE — 99285 EMERGENCY DEPT VISIT HI MDM: CPT

## 2021-07-18 PROCEDURE — 99222 1ST HOSP IP/OBS MODERATE 55: CPT | Mod: 24

## 2021-07-18 PROCEDURE — 74177 CT ABD & PELVIS W/CONTRAST: CPT | Mod: 26

## 2021-07-18 PROCEDURE — 76870 US EXAM SCROTUM: CPT | Mod: 26

## 2021-07-18 RX ORDER — ATORVASTATIN CALCIUM 80 MG/1
80 TABLET, FILM COATED ORAL AT BEDTIME
Refills: 0 | Status: DISCONTINUED | OUTPATIENT
Start: 2021-07-18 | End: 2021-07-23

## 2021-07-18 RX ORDER — ACETAMINOPHEN 500 MG
975 TABLET ORAL ONCE
Refills: 0 | Status: COMPLETED | OUTPATIENT
Start: 2021-07-18 | End: 2021-07-18

## 2021-07-18 RX ORDER — PIPERACILLIN AND TAZOBACTAM 4; .5 G/20ML; G/20ML
3.38 INJECTION, POWDER, LYOPHILIZED, FOR SOLUTION INTRAVENOUS ONCE
Refills: 0 | Status: COMPLETED | OUTPATIENT
Start: 2021-07-18 | End: 2021-07-18

## 2021-07-18 RX ORDER — ONDANSETRON 8 MG/1
4 TABLET, FILM COATED ORAL ONCE
Refills: 0 | Status: COMPLETED | OUTPATIENT
Start: 2021-07-18 | End: 2021-07-18

## 2021-07-18 RX ORDER — VANCOMYCIN HCL 1 G
1000 VIAL (EA) INTRAVENOUS ONCE
Refills: 0 | Status: COMPLETED | OUTPATIENT
Start: 2021-07-18 | End: 2021-07-18

## 2021-07-18 RX ORDER — SODIUM CHLORIDE 9 MG/ML
1000 INJECTION INTRAMUSCULAR; INTRAVENOUS; SUBCUTANEOUS ONCE
Refills: 0 | Status: COMPLETED | OUTPATIENT
Start: 2021-07-18 | End: 2021-07-18

## 2021-07-18 RX ORDER — MORPHINE SULFATE 50 MG/1
4 CAPSULE, EXTENDED RELEASE ORAL ONCE
Refills: 0 | Status: DISCONTINUED | OUTPATIENT
Start: 2021-07-18 | End: 2021-07-18

## 2021-07-18 RX ADMIN — Medication 975 MILLIGRAM(S): at 19:44

## 2021-07-18 RX ADMIN — Medication 975 MILLIGRAM(S): at 20:37

## 2021-07-18 RX ADMIN — SODIUM CHLORIDE 1000 MILLILITER(S): 9 INJECTION INTRAMUSCULAR; INTRAVENOUS; SUBCUTANEOUS at 23:09

## 2021-07-18 RX ADMIN — ONDANSETRON 4 MILLIGRAM(S): 8 TABLET, FILM COATED ORAL at 17:59

## 2021-07-18 RX ADMIN — SODIUM CHLORIDE 1000 MILLILITER(S): 9 INJECTION INTRAMUSCULAR; INTRAVENOUS; SUBCUTANEOUS at 17:59

## 2021-07-18 RX ADMIN — PIPERACILLIN AND TAZOBACTAM 200 GRAM(S): 4; .5 INJECTION, POWDER, LYOPHILIZED, FOR SOLUTION INTRAVENOUS at 17:59

## 2021-07-18 RX ADMIN — MORPHINE SULFATE 4 MILLIGRAM(S): 50 CAPSULE, EXTENDED RELEASE ORAL at 17:59

## 2021-07-18 RX ADMIN — PIPERACILLIN AND TAZOBACTAM 3.38 GRAM(S): 4; .5 INJECTION, POWDER, LYOPHILIZED, FOR SOLUTION INTRAVENOUS at 20:37

## 2021-07-18 RX ADMIN — Medication 250 MILLIGRAM(S): at 21:10

## 2021-07-18 RX ADMIN — MORPHINE SULFATE 4 MILLIGRAM(S): 50 CAPSULE, EXTENDED RELEASE ORAL at 20:37

## 2021-07-18 NOTE — H&P ADULT - ATTENDING COMMENTS
Pt seen and examined with resident in the ER. Clinically pt appears to have R orchitis and prostatitis with signs of systemic infection with fever, leukocytosis. Exam shows enlarged and tender R testis with some warmth but no erythema of overlying scrotal skin. There is no tenderness of skin/subQ, only when testis is palpated. No crepitus by my exam of scrotum and perineum.     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 Discussed with pt and his wife in ER. Possible pt with orchitis/prostatitis and subQ air retained from surgery. However, given above risks, need to plan for surgical exploration and debridement. Discussed the grave nature of Fourniers. Discussed with pt and his wife and signed consent. Discussed with pts daughters over facetime.

## 2021-07-18 NOTE — H&P ADULT - ASSESSMENT
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 Davenport

## 2021-07-18 NOTE — ED PROVIDER NOTE - NS ED ROS FT
CONST: + fevers, no chills  EYES: no pain, no vision changes  ENT: no sore throat, no ear pain, no change in hearing  CV: no chest pain, no leg swelling  RESP: no shortness of breath, no cough  ABD: + abdominal pain, + nausea, + vomiting, no diarrhea  : + dysuria, + flank pain, + hematuria  MSK: no back pain, no extremity pain  NEURO: no headache or additional neurologic complaints  HEME: no easy bleeding  SKIN:  no rash

## 2021-07-18 NOTE — H&P ADULT - HISTORY OF PRESENT ILLNESS
Mr. Faustin is a 70yom with PMH significant for T2DM and recent suprapubic prostatectomy and bladder diverticulectomy.  He now presents with few days of severe abdominal pain, dysuria, back pain, fever, and R testicular pain.  Pain began a few days ago, and has steadily worsened since then.  Today, he developed intractable N/V and so was advised to come into the ED for evaluation.  Here, + leukocytosis to 22, UA with pyuria, lactate of 2.7.  Febrile to 102.2 and tachy to 105.  CT showing air in the scrotum tracking up the bilateral spermatic cords.   Mr. Faustin is a 70yom with PMH significant for T2DM and recent RAL suprapubic prostatectomy and bladder diverticulectomy.  He now presents with few days of abdominal pain, dysuria, back pain, fever, and R testicular pain.  Pain began a few days ago, and has steadily worsened since then.  Today, he developed intractable N/V and so was advised to come into the ED for evaluation.  Here, + leukocytosis to 22, UA with pyuria, lactate of 2.7.  Febrile to 102.2 and tachy to 105.  CT showing air in the scrotum tracking up the bilateral spermatic cords.

## 2021-07-18 NOTE — ED ADULT NURSE NOTE - OBJECTIVE STATEMENT
break coverage RN - pt received in room 22, A&OX3. pt had recent bladder/prostate surgery on 6/29. presents to ED today c/o abdominal pain, fever, nausea and vomiting. told by PCP to come to ED. pt currently febrile. abd soft non distended. resp even and unlabored. NSR on cardiac monitor. denies cp sob. 18G IV placed to L forearm. labs drawn and sent. will continue to monitor.

## 2021-07-18 NOTE — ED PROVIDER NOTE - PMH
Reason for Call: Request for an order or referral:    Order or referral being requested: Tri has yearly appointment on 4/25/19 and lab appointment on 4/23/19.  What labs would you like her to have done?    Please review and place orders prior to 4/23/19.  Thank you..Maureen Valdivia    Date needed: 4/23/19    Has the patient been seen by the PCP for this problem? NO - has future appointment on 4/25/19    Phone number Patient can be reached at:  Home number on file [phone] (home)    Best Time:  Call back only if questions    Can we leave a detailed message on this number?  YES    Call taken on 3/26/2019 at 11:00 AM by Maureen Valdivia     Arthritis  s/p Bilateral TKR  Diabetes mellitus  x 10 years FS this am 160  HTN (hypertension)    Hypercholesterolemia    Kidney stones  Per pt Left ; as per pt ; being monitored

## 2021-07-18 NOTE — ED PROVIDER NOTE - PROGRESS NOTE DETAILS
O'Shannan DO PGY-2: US is concerning for gas. Called CT to expedite scan Blu DO PGY-2: called CT they said he is next

## 2021-07-18 NOTE — ED PROVIDER NOTE - CLINICAL SUMMARY MEDICAL DECISION MAKING FREE TEXT BOX
71 y/o M with hx of HTN, HLD, DM, bladder and prostate resection presents to the ED c/o dysuria, hematuria, abdominal pain. Patient febrile and tachycardic on arrival. Exam with mild diffuse lower abd tenderness and R testicular tenderness. Concern for pyelonephritis vs epididymitis vs orchitis vs post surgical complication. Plan for labs, CT, testicular U/S. Will dose fluids and abx for sepsis. Urology consult. Admit. Reinaldo Maldonado, DO PGY3

## 2021-07-18 NOTE — ED PROVIDER NOTE - OBJECTIVE STATEMENT
71 y/o M with PMH HTN, HLD, DM, recent bladder and prostate surgery presenting to the ED c/o abdominal pain. He reports dysuria and lower abdominal pain for the past few days. States his urine appears cloudy and is more bloody than normal. Also endorsing lower abdominal pain radiating upwards to the flank. Today reports nausea and vomiting. Denies fever or chills at home but febrile in the ED. Was sent in by his urologist for further evaluation. Urologist: Dr. Davenport 69 y/o M with PMH HTN, HLD, DM, recent bladder and prostate surgery presenting to the ED c/o abdominal pain. He reports dysuria and lower abdominal pain for the past few days. States his urine appears cloudy and is more bloody than normal. Also endorsing lower abdominal pain radiating upwards to the flank. Today reports nausea and vomiting. Denies fever or chills at home but febrile in the ED. Was sent in by his urologist for further evaluation. Urologist: Dr. Davenport  Attending - Agree with above.  I evaluated patient myself. 69 y/o M with wife at bedside.  s/p prostatectomy and bladder surgery on 6/29/21 by Dr. Davenport.  Reports was doing well post surgery.  Dysuria and hematuria x few days.  Spoke to ALEYDA CALLAHAN yesterday.  Today with worsening abd pain, n/v, and fever.  Spoke to ALEYDA CALLAHAN and referred to ED.  Denies cough, covid.  No CP or shortness of breath.

## 2021-07-18 NOTE — ED PROVIDER NOTE - PHYSICAL EXAMINATION
GENERAL: Awake, alert, NAD  HEENT: NC/AT, moist mucous membranes  LUNGS: CTAB, no wheezes or crackles   CARDIAC: tachycardic, regular rhythm, no m/r/g  ABDOMEN: Soft, +diffuse lower abdominal tenderness, post op incision sites appear clean, dry, and intact, non distended, no rebound, no guarding  : R testicle appears swollen and tender to palpation  BACK: No midline spinal tenderness, no CVA tenderness  EXT: No edema, no calf tenderness, 2+ DP pulses bilaterally, no deformities.  NEURO: A&Ox3. Moving all extremities.  SKIN: Warm and dry. No rash.  PSYCH: Normal affect. GENERAL: Awake, alert, NAD  HEENT: NC/AT, moist mucous membranes  LUNGS: CTAB, no wheezes or crackles   CARDIAC: tachycardic, regular rhythm, no m/r/g  ABDOMEN: Soft, +diffuse lower abdominal tenderness, post op incision sites appear clean, dry, and intact, non distended, no rebound, no guarding  : R testicle appears swollen and tender to palpation  BACK: No midline spinal tenderness, no CVA tenderness  EXT: No edema, no calf tenderness, 2+ DP pulses bilaterally, no deformities.  NEURO: A&Ox3. Moving all extremities.  SKIN: Warm and dry. No rash.  PSYCH: Normal affect.  ATTENDING PHYSICAL EXAM  GEN - NAD; well appearing; A+O x3  HEAD - NC/AT; EYES/NOSE - PERRL, EOMI, mucous membranes moist, no discharge; THROAT: Oral cavity and pharynx normal. No inflammation, swelling, exudate, or lesions  NECK: Neck supple, non-tender without lymphadenopathy, no masses, no JVD  PULMONARY - CTA b/l, symmetric breath sounds, no w/r/r  CARDIAC -s1s2, RRR, no M,R,G  ABDOMEN - +noted lap sites c/D/I.  + mild b/l lower abd tenderness.     - uncircum, no phimosis or paraphimosis.  Noted right testicle with swelling and severe tenderness.    BACK - no CVA tenderness, No vertebral or paravertebral tenderness  EXTREMITIES - symmetric pulses, 2+ dp, capillary refill < 2 seconds, no clubbing, no cyanosis, no edema  NEUROLOGIC - alert, CN 2-12 intact

## 2021-07-18 NOTE — H&P ADULT - NSHPPHYSICALEXAM_GEN_ALL_CORE
In some discomfort  Abdomen mildly distended and mildly TTP  : R testicle firm and enlarged, left testicle soft.  Bilaterally extremely TTP.  Some minor crepitus over L testicle.  No skin changes In some discomfort  Abdomen mildly distended and mildly TTP  : R testicle firm and enlarged, left testicle soft.  R testis tender to palpation, Bilaterally extremely TTP.  Some minor crepitus over L testicle.  No skin changes

## 2021-07-18 NOTE — ED ADULT NURSE REASSESSMENT NOTE - NS ED NURSE REASSESS COMMENT FT1
report received from YASMANY Mason, pt A&Ox4 reports relief in nausea/vomiting and improvement in abd pain. urology at bedside awaiting dispo.

## 2021-07-18 NOTE — ED ADULT NURSE NOTE - NSIMPLEMENTINTERV_GEN_ALL_ED
Implemented All Universal Safety Interventions:  [place] to call system. Call bell, personal items and telephone within reach. Instruct patient to call for assistance. Room bathroom lighting operational. Non-slip footwear when patient is off stretcher. Physically safe environment: no spills, clutter or unnecessary equipment. Stretcher in lowest position, wheels locked, appropriate side rails in place.

## 2021-07-18 NOTE — ED ADULT TRIAGE NOTE - CHIEF COMPLAINT QUOTE
Pt s/p prostate and bladder surgery on 6/29, c/o abd pain, N/V, fever. Told by his doctor to come to ED. Febrile in triage.

## 2021-07-19 DIAGNOSIS — I10 ESSENTIAL (PRIMARY) HYPERTENSION: ICD-10-CM

## 2021-07-19 DIAGNOSIS — Z29.9 ENCOUNTER FOR PROPHYLACTIC MEASURES, UNSPECIFIED: ICD-10-CM

## 2021-07-19 DIAGNOSIS — E78.00 PURE HYPERCHOLESTEROLEMIA, UNSPECIFIED: ICD-10-CM

## 2021-07-19 DIAGNOSIS — E11.69 TYPE 2 DIABETES MELLITUS WITH OTHER SPECIFIED COMPLICATION: ICD-10-CM

## 2021-07-19 DIAGNOSIS — A41.9 SEPSIS, UNSPECIFIED ORGANISM: ICD-10-CM

## 2021-07-19 LAB
ANION GAP SERPL CALC-SCNC: 15 MMOL/L — HIGH (ref 7–14)
ANION GAP SERPL CALC-SCNC: 16 MMOL/L — HIGH (ref 7–14)
APTT BLD: 23 SEC — LOW (ref 27–36.3)
BASOPHILS # BLD AUTO: 0.04 K/UL — SIGNIFICANT CHANGE UP (ref 0–0.2)
BASOPHILS NFR BLD AUTO: 0.2 % — SIGNIFICANT CHANGE UP (ref 0–2)
BLD GP AB SCN SERPL QL: NEGATIVE — SIGNIFICANT CHANGE UP
BUN SERPL-MCNC: 7 MG/DL — SIGNIFICANT CHANGE UP (ref 7–23)
BUN SERPL-MCNC: 9 MG/DL — SIGNIFICANT CHANGE UP (ref 7–23)
CALCIUM SERPL-MCNC: 8.4 MG/DL — SIGNIFICANT CHANGE UP (ref 8.4–10.5)
CALCIUM SERPL-MCNC: 8.6 MG/DL — SIGNIFICANT CHANGE UP (ref 8.4–10.5)
CHLORIDE SERPL-SCNC: 102 MMOL/L — SIGNIFICANT CHANGE UP (ref 98–107)
CHLORIDE SERPL-SCNC: 99 MMOL/L — SIGNIFICANT CHANGE UP (ref 98–107)
CO2 SERPL-SCNC: 20 MMOL/L — LOW (ref 22–31)
CO2 SERPL-SCNC: 22 MMOL/L — SIGNIFICANT CHANGE UP (ref 22–31)
CREAT SERPL-MCNC: 0.51 MG/DL — SIGNIFICANT CHANGE UP (ref 0.5–1.3)
CREAT SERPL-MCNC: 0.54 MG/DL — SIGNIFICANT CHANGE UP (ref 0.5–1.3)
CRP SERPL-MCNC: 93.9 MG/L — HIGH
EOSINOPHIL # BLD AUTO: 0.1 K/UL — SIGNIFICANT CHANGE UP (ref 0–0.5)
EOSINOPHIL NFR BLD AUTO: 0.6 % — SIGNIFICANT CHANGE UP (ref 0–6)
GLUCOSE BLDC GLUCOMTR-MCNC: 165 MG/DL — HIGH (ref 70–99)
GLUCOSE BLDC GLUCOMTR-MCNC: 190 MG/DL — HIGH (ref 70–99)
GLUCOSE BLDC GLUCOMTR-MCNC: 199 MG/DL — HIGH (ref 70–99)
GLUCOSE BLDC GLUCOMTR-MCNC: 215 MG/DL — HIGH (ref 70–99)
GLUCOSE BLDC GLUCOMTR-MCNC: 236 MG/DL — HIGH (ref 70–99)
GLUCOSE SERPL-MCNC: 183 MG/DL — HIGH (ref 70–99)
GLUCOSE SERPL-MCNC: 247 MG/DL — HIGH (ref 70–99)
HCT VFR BLD CALC: 35 % — LOW (ref 39–50)
HCT VFR BLD CALC: 37.3 % — LOW (ref 39–50)
HCV AB S/CO SERPL IA: 0.1 S/CO — SIGNIFICANT CHANGE UP (ref 0–0.99)
HCV AB SERPL-IMP: SIGNIFICANT CHANGE UP
HGB BLD-MCNC: 11.2 G/DL — LOW (ref 13–17)
HGB BLD-MCNC: 12.2 G/DL — LOW (ref 13–17)
IANC: 13.92 K/UL — HIGH (ref 1.5–8.5)
IMM GRANULOCYTES NFR BLD AUTO: 0.5 % — SIGNIFICANT CHANGE UP (ref 0–1.5)
INR BLD: 1.26 RATIO — HIGH (ref 0.88–1.16)
LYMPHOCYTES # BLD AUTO: 1.4 K/UL — SIGNIFICANT CHANGE UP (ref 1–3.3)
LYMPHOCYTES # BLD AUTO: 8 % — LOW (ref 13–44)
MCHC RBC-ENTMCNC: 28.1 PG — SIGNIFICANT CHANGE UP (ref 27–34)
MCHC RBC-ENTMCNC: 28.3 PG — SIGNIFICANT CHANGE UP (ref 27–34)
MCHC RBC-ENTMCNC: 32 GM/DL — SIGNIFICANT CHANGE UP (ref 32–36)
MCHC RBC-ENTMCNC: 32.7 GM/DL — SIGNIFICANT CHANGE UP (ref 32–36)
MCV RBC AUTO: 86.5 FL — SIGNIFICANT CHANGE UP (ref 80–100)
MCV RBC AUTO: 87.9 FL — SIGNIFICANT CHANGE UP (ref 80–100)
MONOCYTES # BLD AUTO: 1.92 K/UL — HIGH (ref 0–0.9)
MONOCYTES NFR BLD AUTO: 11 % — SIGNIFICANT CHANGE UP (ref 2–14)
NEUTROPHILS # BLD AUTO: 13.92 K/UL — HIGH (ref 1.8–7.4)
NEUTROPHILS NFR BLD AUTO: 79.7 % — HIGH (ref 43–77)
NRBC # BLD: 0 /100 WBCS — SIGNIFICANT CHANGE UP
NRBC # BLD: 0 /100 WBCS — SIGNIFICANT CHANGE UP
NRBC # FLD: 0 K/UL — SIGNIFICANT CHANGE UP
NRBC # FLD: 0 K/UL — SIGNIFICANT CHANGE UP
PLATELET # BLD AUTO: 249 K/UL — SIGNIFICANT CHANGE UP (ref 150–400)
PLATELET # BLD AUTO: 272 K/UL — SIGNIFICANT CHANGE UP (ref 150–400)
POTASSIUM SERPL-MCNC: 3.4 MMOL/L — LOW (ref 3.5–5.3)
POTASSIUM SERPL-MCNC: 3.6 MMOL/L — SIGNIFICANT CHANGE UP (ref 3.5–5.3)
POTASSIUM SERPL-SCNC: 3.4 MMOL/L — LOW (ref 3.5–5.3)
POTASSIUM SERPL-SCNC: 3.6 MMOL/L — SIGNIFICANT CHANGE UP (ref 3.5–5.3)
PROTHROM AB SERPL-ACNC: 14.3 SEC — HIGH (ref 10.6–13.6)
RBC # BLD: 3.98 M/UL — LOW (ref 4.2–5.8)
RBC # BLD: 4.31 M/UL — SIGNIFICANT CHANGE UP (ref 4.2–5.8)
RBC # FLD: 13.4 % — SIGNIFICANT CHANGE UP (ref 10.3–14.5)
RBC # FLD: 13.6 % — SIGNIFICANT CHANGE UP (ref 10.3–14.5)
RH IG SCN BLD-IMP: POSITIVE — SIGNIFICANT CHANGE UP
SODIUM SERPL-SCNC: 136 MMOL/L — SIGNIFICANT CHANGE UP (ref 135–145)
SODIUM SERPL-SCNC: 138 MMOL/L — SIGNIFICANT CHANGE UP (ref 135–145)
WBC # BLD: 17.46 K/UL — HIGH (ref 3.8–10.5)
WBC # BLD: 19.29 K/UL — HIGH (ref 3.8–10.5)
WBC # FLD AUTO: 17.46 K/UL — HIGH (ref 3.8–10.5)
WBC # FLD AUTO: 19.29 K/UL — HIGH (ref 3.8–10.5)

## 2021-07-19 PROCEDURE — 99223 1ST HOSP IP/OBS HIGH 75: CPT

## 2021-07-19 PROCEDURE — 11004 DBRDMT SKIN XTRNL GENT&PER: CPT

## 2021-07-19 RX ORDER — VANCOMYCIN HCL 1 G
1250 VIAL (EA) INTRAVENOUS EVERY 12 HOURS
Refills: 0 | Status: DISCONTINUED | OUTPATIENT
Start: 2021-07-19 | End: 2021-07-20

## 2021-07-19 RX ORDER — HYDROMORPHONE HYDROCHLORIDE 2 MG/ML
0.5 INJECTION INTRAMUSCULAR; INTRAVENOUS; SUBCUTANEOUS
Refills: 0 | Status: DISCONTINUED | OUTPATIENT
Start: 2021-07-19 | End: 2021-07-19

## 2021-07-19 RX ORDER — HYDROMORPHONE HYDROCHLORIDE 2 MG/ML
1 INJECTION INTRAMUSCULAR; INTRAVENOUS; SUBCUTANEOUS EVERY 4 HOURS
Refills: 0 | Status: DISCONTINUED | OUTPATIENT
Start: 2021-07-19 | End: 2021-07-19

## 2021-07-19 RX ORDER — DEXTROSE 50 % IN WATER 50 %
12.5 SYRINGE (ML) INTRAVENOUS ONCE
Refills: 0 | Status: DISCONTINUED | OUTPATIENT
Start: 2021-07-19 | End: 2021-07-23

## 2021-07-19 RX ORDER — ACETAMINOPHEN 500 MG
975 TABLET ORAL EVERY 6 HOURS
Refills: 0 | Status: DISCONTINUED | OUTPATIENT
Start: 2021-07-19 | End: 2021-07-23

## 2021-07-19 RX ORDER — NIFEDIPINE 30 MG
30 TABLET, EXTENDED RELEASE 24 HR ORAL DAILY
Refills: 0 | Status: DISCONTINUED | OUTPATIENT
Start: 2021-07-19 | End: 2021-07-23

## 2021-07-19 RX ORDER — SODIUM CHLORIDE 9 MG/ML
1000 INJECTION, SOLUTION INTRAVENOUS
Refills: 0 | Status: DISCONTINUED | OUTPATIENT
Start: 2021-07-19 | End: 2021-07-23

## 2021-07-19 RX ORDER — PIPERACILLIN AND TAZOBACTAM 4; .5 G/20ML; G/20ML
3.38 INJECTION, POWDER, LYOPHILIZED, FOR SOLUTION INTRAVENOUS EVERY 8 HOURS
Refills: 0 | Status: DISCONTINUED | OUTPATIENT
Start: 2021-07-19 | End: 2021-07-23

## 2021-07-19 RX ORDER — SODIUM CHLORIDE 9 MG/ML
1000 INJECTION, SOLUTION INTRAVENOUS
Refills: 0 | Status: DISCONTINUED | OUTPATIENT
Start: 2021-07-19 | End: 2021-07-20

## 2021-07-19 RX ORDER — LISINOPRIL 2.5 MG/1
5 TABLET ORAL DAILY
Refills: 0 | Status: DISCONTINUED | OUTPATIENT
Start: 2021-07-19 | End: 2021-07-23

## 2021-07-19 RX ORDER — DEXTROSE 50 % IN WATER 50 %
15 SYRINGE (ML) INTRAVENOUS ONCE
Refills: 0 | Status: DISCONTINUED | OUTPATIENT
Start: 2021-07-19 | End: 2021-07-23

## 2021-07-19 RX ORDER — HYDROCHLOROTHIAZIDE 25 MG
25 TABLET ORAL EVERY 24 HOURS
Refills: 0 | Status: DISCONTINUED | OUTPATIENT
Start: 2021-07-19 | End: 2021-07-23

## 2021-07-19 RX ORDER — SENNA PLUS 8.6 MG/1
2 TABLET ORAL AT BEDTIME
Refills: 0 | Status: DISCONTINUED | OUTPATIENT
Start: 2021-07-19 | End: 2021-07-23

## 2021-07-19 RX ORDER — INSULIN LISPRO 100/ML
VIAL (ML) SUBCUTANEOUS AT BEDTIME
Refills: 0 | Status: DISCONTINUED | OUTPATIENT
Start: 2021-07-19 | End: 2021-07-23

## 2021-07-19 RX ORDER — HYDROMORPHONE HYDROCHLORIDE 2 MG/ML
2 INJECTION INTRAMUSCULAR; INTRAVENOUS; SUBCUTANEOUS EVERY 4 HOURS
Refills: 0 | Status: DISCONTINUED | OUTPATIENT
Start: 2021-07-19 | End: 2021-07-19

## 2021-07-19 RX ORDER — HEPARIN SODIUM 5000 [USP'U]/ML
5000 INJECTION INTRAVENOUS; SUBCUTANEOUS EVERY 8 HOURS
Refills: 0 | Status: DISCONTINUED | OUTPATIENT
Start: 2021-07-19 | End: 2021-07-23

## 2021-07-19 RX ORDER — HYDROMORPHONE HYDROCHLORIDE 2 MG/ML
0.5 INJECTION INTRAMUSCULAR; INTRAVENOUS; SUBCUTANEOUS EVERY 4 HOURS
Refills: 0 | Status: DISCONTINUED | OUTPATIENT
Start: 2021-07-19 | End: 2021-07-22

## 2021-07-19 RX ORDER — DEXTROSE 50 % IN WATER 50 %
25 SYRINGE (ML) INTRAVENOUS ONCE
Refills: 0 | Status: DISCONTINUED | OUTPATIENT
Start: 2021-07-19 | End: 2021-07-23

## 2021-07-19 RX ORDER — OXYBUTYNIN CHLORIDE 5 MG
5 TABLET ORAL THREE TIMES A DAY
Refills: 0 | Status: DISCONTINUED | OUTPATIENT
Start: 2021-07-19 | End: 2021-07-23

## 2021-07-19 RX ORDER — HYDROMORPHONE HYDROCHLORIDE 2 MG/ML
1 INJECTION INTRAMUSCULAR; INTRAVENOUS; SUBCUTANEOUS EVERY 4 HOURS
Refills: 0 | Status: DISCONTINUED | OUTPATIENT
Start: 2021-07-19 | End: 2021-07-22

## 2021-07-19 RX ORDER — INSULIN LISPRO 100/ML
VIAL (ML) SUBCUTANEOUS
Refills: 0 | Status: DISCONTINUED | OUTPATIENT
Start: 2021-07-19 | End: 2021-07-23

## 2021-07-19 RX ORDER — GLUCAGON INJECTION, SOLUTION 0.5 MG/.1ML
1 INJECTION, SOLUTION SUBCUTANEOUS ONCE
Refills: 0 | Status: DISCONTINUED | OUTPATIENT
Start: 2021-07-19 | End: 2021-07-23

## 2021-07-19 RX ADMIN — HEPARIN SODIUM 5000 UNIT(S): 5000 INJECTION INTRAVENOUS; SUBCUTANEOUS at 21:26

## 2021-07-19 RX ADMIN — PIPERACILLIN AND TAZOBACTAM 25 GRAM(S): 4; .5 INJECTION, POWDER, LYOPHILIZED, FOR SOLUTION INTRAVENOUS at 13:55

## 2021-07-19 RX ADMIN — SENNA PLUS 2 TABLET(S): 8.6 TABLET ORAL at 21:26

## 2021-07-19 RX ADMIN — Medication 166.67 MILLIGRAM(S): at 05:53

## 2021-07-19 RX ADMIN — PIPERACILLIN AND TAZOBACTAM 25 GRAM(S): 4; .5 INJECTION, POWDER, LYOPHILIZED, FOR SOLUTION INTRAVENOUS at 06:45

## 2021-07-19 RX ADMIN — PIPERACILLIN AND TAZOBACTAM 25 GRAM(S): 4; .5 INJECTION, POWDER, LYOPHILIZED, FOR SOLUTION INTRAVENOUS at 21:28

## 2021-07-19 RX ADMIN — ATORVASTATIN CALCIUM 80 MILLIGRAM(S): 80 TABLET, FILM COATED ORAL at 21:26

## 2021-07-19 RX ADMIN — HEPARIN SODIUM 5000 UNIT(S): 5000 INJECTION INTRAVENOUS; SUBCUTANEOUS at 13:51

## 2021-07-19 RX ADMIN — Medication 2: at 12:00

## 2021-07-19 RX ADMIN — SODIUM CHLORIDE 125 MILLILITER(S): 9 INJECTION, SOLUTION INTRAVENOUS at 02:35

## 2021-07-19 RX ADMIN — Medication 2: at 06:00

## 2021-07-19 RX ADMIN — Medication 4: at 16:57

## 2021-07-19 RX ADMIN — HEPARIN SODIUM 5000 UNIT(S): 5000 INJECTION INTRAVENOUS; SUBCUTANEOUS at 05:54

## 2021-07-19 RX ADMIN — Medication 166.67 MILLIGRAM(S): at 18:24

## 2021-07-19 NOTE — CONSULT NOTE ADULT - PROBLEM SELECTOR RECOMMENDATION 9
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  -Cont Zosyn and Vanco  -F/U Vanco trough  -F/U wound cx and blood cx   -Consider ID consult

## 2021-07-19 NOTE — CONSULT NOTE ADULT - SUBJECTIVE AND OBJECTIVE BOX
Patient is a 70y old  Male who presents with a chief complaint of     HPI:  Mr. Faustin is a 70yom with PMH significant for T2DM and recent RAL suprapubic prostatectomy and bladder diverticulectomy.  He now presents with few days of abdominal pain, dysuria, back pain, fever, and R testicular pain.  Pain began a few days ago, and has steadily worsened since then.  Today, he developed intractable N/V and so was advised to come into the ED for evaluation.  Here, + leukocytosis to 22, UA with pyuria, lactate of 2.7.  Febrile to 102.2 and tachy to 105.  CT showing air in the scrotum tracking up the bilateral spermatic cords.   (2021 23:36). S/P scrotal exploration in OR today. POD#0. Now pt has no complaints, denies any CP or SOB. Passing flatus       History limited due to: [ ] Dementia  [ ] Delirium  [ ] Condition    Pain Symptoms if applicable:             	                         none	    Pain:	                            0	     Location:	  Modifying factors:	  Associated symptoms:	    Function: [ x] Independent  [ ] Assistance  [ ] Total care  [ ] Non-ambulatory    Allergies    No Known Allergies    Intolerances        HOME MEDICATIONS: [x] Reviewed    MEDICATIONS  (STANDING):  atorvastatin 80 milliGRAM(s) Oral at bedtime  dextrose 40% Gel 15 Gram(s) Oral once  dextrose 5%. 1000 milliLiter(s) (50 mL/Hr) IV Continuous <Continuous>  dextrose 5%. 1000 milliLiter(s) (100 mL/Hr) IV Continuous <Continuous>  dextrose 50% Injectable 25 Gram(s) IV Push once  dextrose 50% Injectable 12.5 Gram(s) IV Push once  dextrose 50% Injectable 25 Gram(s) IV Push once  glucagon  Injectable 1 milliGRAM(s) IntraMuscular once  heparin   Injectable 5000 Unit(s) SubCutaneous every 8 hours  hydrochlorothiazide 25 milliGRAM(s) Oral every 24 hours  insulin lispro (ADMELOG) corrective regimen sliding scale   SubCutaneous three times a day before meals  insulin lispro (ADMELOG) corrective regimen sliding scale   SubCutaneous at bedtime  lactated ringers. 1000 milliLiter(s) (125 mL/Hr) IV Continuous <Continuous>  lisinopril 5 milliGRAM(s) Oral daily  NIFEdipine XL 30 milliGRAM(s) Oral daily  piperacillin/tazobactam IVPB.. 3.375 Gram(s) IV Intermittent every 8 hours  senna 2 Tablet(s) Oral at bedtime  vancomycin  IVPB 1250 milliGRAM(s) IV Intermittent every 12 hours    MEDICATIONS  (PRN):  acetaminophen   Tablet .. 975 milliGRAM(s) Oral every 6 hours PRN Mild Pain (1 - 3)  HYDROmorphone  Injectable 0.5 milliGRAM(s) IV Push every 4 hours PRN Moderate Pain (4 - 6)  HYDROmorphone  Injectable 1 milliGRAM(s) IV Push every 4 hours PRN Severe Pain (7 - 10)  oxybutynin 5 milliGRAM(s) Oral three times a day PRN Bladder spasms      PAST MEDICAL & SURGICAL HISTORY:  HTN (hypertension)    Hypercholesterolemia    Diabetes mellitus  x 10 years FS this am 160    Arthritis  s/p Bilateral TKR    Kidney stones  Per pt Left ; as per pt ; being monitored    S/P total knee replacement  Bilateral 2020    [ x] Reviewed     SOCIAL HISTORY:  Residence: [ ] [facility]  [ ] [facility]  [x ] Community  [ ] Substance abuse: denies   [ ] Tobacco: denies  [ ] Alcohol use: denies    FAMILY HISTORY:  FH: diabetes mellitus (Father, Grandparent, Aunt)    [ ] No pertinent family history in first degree relatives     REVIEW OF SYSTEMS:    CONSTITUTIONAL: (+) fever  EYES: No eye pain, visual disturbances, or discharge  ENMT:  No difficulty hearing, tinnitus, vertigo; No sinus or throat pain  NECK: No pain or stiffness  BREASTS: No pain, masses, or nipple discharge  RESPIRATORY: No cough, wheezing, chills or hemoptysis; No shortness of breath  CARDIOVASCULAR: No chest pain, palpitations, dizziness, or leg swelling  GASTROINTESTINAL: (+) nausea, vomiting, No hematemesis; No diarrhea or constipation. No melena or hematochezia.  GENITOURINARY:(+) pain at scrotal area   NEUROLOGICAL: No headaches, memory loss, loss of strength, numbness, or tremors  SKIN: No itching, burning, rashes, or lesions   LYMPH NODES: No enlarged glands  ENDOCRINE: No heat or cold intolerance; No hair loss  MUSCULOSKELETAL: No muscle or back pain  PSYCHIATRIC: No depression, anxiety, mood swings, or difficulty sleeping  HEME/LYMPH: No easy bruising, or bleeding gums  ALLERGY AND IMMUNOLOGIC: No hives or eczema    [  ] All other ROS negative  [  ] Unable to obtain due to poor mental status    Vital Signs Last 24 Hrs  T(C): 36.8 (2021 09:09), Max: 39 (2021 16:53)  T(F): 98.3 (2021 09:09), Max: 102.2 (2021 16:53)  HR: 76 (2021 09:09) (71 - 105)  BP: 112/61 (2021 09:09) (104/58 - 149/79)  BP(mean): 69 (2021 08:00) (65 - 80)  RR: 16 (2021 09:09) (15 - 20)  SpO2: 96% (2021 09:09) (93% - 100%)    PHYSICAL EXAM:    GENERAL: NAD, well-groomed, well-developed  HEAD:  Atraumatic, Normocephalic  EYES: EOMI, PERRLA, conjunctiva and sclera clear  ENMT: Moist mucous membranes  NECK: Supple, No JVD  RESPIRATORY: Clear to auscultation bilaterally; No rales, rhonchi, wheezing, or rubs  CARDIOVASCULAR: Regular rate and rhythm; No murmurs, rubs, or gallops  GASTROINTESTINAL: Soft, Nontender, Nondistended; Bowel sounds present  GENITOURINARY: Not examined  EXTREMITIES:  2+ Peripheral Pulses, No clubbing, cyanosis, or edema  NERVOUS SYSTEM:  Alert & Oriented X3; Moving all 4 extremities; No gross sensory deficits  HEME/LYMPH: No lymphadenopathy noted  SKIN: No rashes or lesions; Incisions C/D/I    LABS:                        11.2   19.29 )-----------( 249      ( 2021 07:50 )             35.0     07-    136  |  99  |  9   ----------------------------<  247<H>  3.6   |  22  |  0.54    Ca    8.4      2021 07:49    TPro  8.3  /  Alb  4.2  /  TBili  0.6  /  DBili  x   /  AST  40  /  ALT  26  /  AlkPhos  50  07-18    PT/INR - ( 2021 23:13 )   PT: 14.3 sec;   INR: 1.26 ratio         PTT - ( 2021 23:13 )  PTT:23.0 sec  Urinalysis Basic - ( 2021 19:22 )    Color: Yellow / Appearance: Turbid / S.028 / pH: x  Gluc: x / Ketone: Large  / Bili: Negative / Urobili: <2 mg/dL   Blood: x / Protein: 100 mg/dL / Nitrite: Negative   Leuk Esterase: Large / RBC: 5 /HPF / WBC >50 /HPF   Sq Epi: x / Non Sq Epi: 4 /HPF / Bacteria: Moderate    aA1C with Estimated Average Glucose (21 @ 13:12)   A1C with Estimated Average Glucose Result: 7.4: High Risk (prediabetic) 5.7 - 6.4 %   Diabetic, diagnostic > 6.5 %   ADA diabetic treatment goal < 7.0 %   HbA1C values may not accurately reflect mean blood glucose in patients   with Hb variants. Suggest clinical correlation. 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    Circumferential bladder wall thickening, which may be secondary to chronic obstruction, underdistention, or cystitis. Recommend correlation with urinalysis.    These findings were discussed with Dr. Hurt on  2021 at 10:28 PM by Dr. Uriarte with read back confirmation.    --- End of Report ---            MARTINEZ URIARTE MD; Resident Radiology  This document has been electronically signed.  MARLENI RENTERIA MD; Attending Radiologist  This document has been electronically signed. 2021 10:55PM    < end of copied text >              Consultant(s) notes reviewed:    Care Discussed with Consultant(s)/Other Providers:    Advanced Directives: [ ] DNR  [ ] No feeding tube  [ ] MOLST in chart  [ ] MOLST completed today  [x ] Unknown

## 2021-07-19 NOTE — CONSULT NOTE ADULT - ASSESSMENT
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

## 2021-07-20 LAB
ANION GAP SERPL CALC-SCNC: 12 MMOL/L — SIGNIFICANT CHANGE UP (ref 7–14)
BUN SERPL-MCNC: 9 MG/DL — SIGNIFICANT CHANGE UP (ref 7–23)
CALCIUM SERPL-MCNC: 8.4 MG/DL — SIGNIFICANT CHANGE UP (ref 8.4–10.5)
CHLORIDE SERPL-SCNC: 105 MMOL/L — SIGNIFICANT CHANGE UP (ref 98–107)
CO2 SERPL-SCNC: 22 MMOL/L — SIGNIFICANT CHANGE UP (ref 22–31)
COVID-19 SPIKE DOMAIN AB INTERP: POSITIVE
COVID-19 SPIKE DOMAIN ANTIBODY RESULT: >250 U/ML — HIGH
CREAT SERPL-MCNC: 0.5 MG/DL — SIGNIFICANT CHANGE UP (ref 0.5–1.3)
GLUCOSE BLDC GLUCOMTR-MCNC: 190 MG/DL — HIGH (ref 70–99)
GLUCOSE BLDC GLUCOMTR-MCNC: 226 MG/DL — HIGH (ref 70–99)
GLUCOSE BLDC GLUCOMTR-MCNC: 295 MG/DL — HIGH (ref 70–99)
GLUCOSE BLDC GLUCOMTR-MCNC: 297 MG/DL — HIGH (ref 70–99)
GLUCOSE SERPL-MCNC: 204 MG/DL — HIGH (ref 70–99)
GRAM STN FLD: SIGNIFICANT CHANGE UP
HCT VFR BLD CALC: 35.1 % — LOW (ref 39–50)
HGB BLD-MCNC: 11.3 G/DL — LOW (ref 13–17)
MCHC RBC-ENTMCNC: 28.5 PG — SIGNIFICANT CHANGE UP (ref 27–34)
MCHC RBC-ENTMCNC: 32.2 GM/DL — SIGNIFICANT CHANGE UP (ref 32–36)
MCV RBC AUTO: 88.6 FL — SIGNIFICANT CHANGE UP (ref 80–100)
NIGHT BLUE STAIN TISS: SIGNIFICANT CHANGE UP
NRBC # BLD: 0 /100 WBCS — SIGNIFICANT CHANGE UP
NRBC # FLD: 0 K/UL — SIGNIFICANT CHANGE UP
PLATELET # BLD AUTO: 260 K/UL — SIGNIFICANT CHANGE UP (ref 150–400)
POTASSIUM SERPL-MCNC: 3.5 MMOL/L — SIGNIFICANT CHANGE UP (ref 3.5–5.3)
POTASSIUM SERPL-SCNC: 3.5 MMOL/L — SIGNIFICANT CHANGE UP (ref 3.5–5.3)
RBC # BLD: 3.96 M/UL — LOW (ref 4.2–5.8)
RBC # FLD: 13.5 % — SIGNIFICANT CHANGE UP (ref 10.3–14.5)
SARS-COV-2 IGG+IGM SERPL QL IA: >250 U/ML — HIGH
SARS-COV-2 IGG+IGM SERPL QL IA: POSITIVE
SODIUM SERPL-SCNC: 139 MMOL/L — SIGNIFICANT CHANGE UP (ref 135–145)
SPECIMEN SOURCE: SIGNIFICANT CHANGE UP
SPECIMEN SOURCE: SIGNIFICANT CHANGE UP
VANCOMYCIN TROUGH SERPL-MCNC: 5.5 UG/ML — LOW (ref 10–20)
WBC # BLD: 11.88 K/UL — HIGH (ref 3.8–10.5)
WBC # FLD AUTO: 11.88 K/UL — HIGH (ref 3.8–10.5)

## 2021-07-20 PROCEDURE — 99232 SBSQ HOSP IP/OBS MODERATE 35: CPT

## 2021-07-20 PROCEDURE — 99233 SBSQ HOSP IP/OBS HIGH 50: CPT

## 2021-07-20 RX ORDER — VANCOMYCIN HCL 1 G
VIAL (EA) INTRAVENOUS
Refills: 0 | Status: DISCONTINUED | OUTPATIENT
Start: 2021-07-20 | End: 2021-07-22

## 2021-07-20 RX ORDER — SODIUM CHLORIDE 9 MG/ML
1000 INJECTION, SOLUTION INTRAVENOUS
Refills: 0 | Status: DISCONTINUED | OUTPATIENT
Start: 2021-07-20 | End: 2021-07-20

## 2021-07-20 RX ORDER — VANCOMYCIN HCL 1 G
1500 VIAL (EA) INTRAVENOUS ONCE
Refills: 0 | Status: COMPLETED | OUTPATIENT
Start: 2021-07-20 | End: 2021-07-20

## 2021-07-20 RX ORDER — VANCOMYCIN HCL 1 G
1500 VIAL (EA) INTRAVENOUS EVERY 12 HOURS
Refills: 0 | Status: DISCONTINUED | OUTPATIENT
Start: 2021-07-20 | End: 2021-07-22

## 2021-07-20 RX ORDER — INSULIN GLARGINE 100 [IU]/ML
10 INJECTION, SOLUTION SUBCUTANEOUS AT BEDTIME
Refills: 0 | Status: DISCONTINUED | OUTPATIENT
Start: 2021-07-20 | End: 2021-07-22

## 2021-07-20 RX ADMIN — INSULIN GLARGINE 10 UNIT(S): 100 INJECTION, SOLUTION SUBCUTANEOUS at 22:09

## 2021-07-20 RX ADMIN — Medication 2: at 22:10

## 2021-07-20 RX ADMIN — Medication 4: at 16:48

## 2021-07-20 RX ADMIN — ATORVASTATIN CALCIUM 80 MILLIGRAM(S): 80 TABLET, FILM COATED ORAL at 21:33

## 2021-07-20 RX ADMIN — HEPARIN SODIUM 5000 UNIT(S): 5000 INJECTION INTRAVENOUS; SUBCUTANEOUS at 21:32

## 2021-07-20 RX ADMIN — Medication 30 MILLIGRAM(S): at 05:26

## 2021-07-20 RX ADMIN — Medication 6: at 11:58

## 2021-07-20 RX ADMIN — SENNA PLUS 2 TABLET(S): 8.6 TABLET ORAL at 21:32

## 2021-07-20 RX ADMIN — PIPERACILLIN AND TAZOBACTAM 25 GRAM(S): 4; .5 INJECTION, POWDER, LYOPHILIZED, FOR SOLUTION INTRAVENOUS at 14:02

## 2021-07-20 RX ADMIN — Medication 2: at 07:23

## 2021-07-20 RX ADMIN — PIPERACILLIN AND TAZOBACTAM 25 GRAM(S): 4; .5 INJECTION, POWDER, LYOPHILIZED, FOR SOLUTION INTRAVENOUS at 22:14

## 2021-07-20 RX ADMIN — Medication 300 MILLIGRAM(S): at 17:39

## 2021-07-20 RX ADMIN — HEPARIN SODIUM 5000 UNIT(S): 5000 INJECTION INTRAVENOUS; SUBCUTANEOUS at 14:02

## 2021-07-20 RX ADMIN — HEPARIN SODIUM 5000 UNIT(S): 5000 INJECTION INTRAVENOUS; SUBCUTANEOUS at 05:26

## 2021-07-20 RX ADMIN — PIPERACILLIN AND TAZOBACTAM 25 GRAM(S): 4; .5 INJECTION, POWDER, LYOPHILIZED, FOR SOLUTION INTRAVENOUS at 05:29

## 2021-07-20 RX ADMIN — Medication 300 MILLIGRAM(S): at 07:36

## 2021-07-20 RX ADMIN — Medication 25 MILLIGRAM(S): at 05:26

## 2021-07-21 LAB
GLUCOSE BLDC GLUCOMTR-MCNC: 261 MG/DL — HIGH (ref 70–99)
GLUCOSE BLDC GLUCOMTR-MCNC: 278 MG/DL — HIGH (ref 70–99)
GLUCOSE BLDC GLUCOMTR-MCNC: 316 MG/DL — HIGH (ref 70–99)
GLUCOSE BLDC GLUCOMTR-MCNC: 349 MG/DL — HIGH (ref 70–99)

## 2021-07-21 PROCEDURE — 99231 SBSQ HOSP IP/OBS SF/LOW 25: CPT

## 2021-07-21 PROCEDURE — 99233 SBSQ HOSP IP/OBS HIGH 50: CPT

## 2021-07-21 RX ORDER — INSULIN LISPRO 100/ML
4 VIAL (ML) SUBCUTANEOUS
Refills: 0 | Status: DISCONTINUED | OUTPATIENT
Start: 2021-07-21 | End: 2021-07-23

## 2021-07-21 RX ORDER — REPAGLINIDE 1 MG/1
1 TABLET ORAL
Refills: 0 | Status: DISCONTINUED | OUTPATIENT
Start: 2021-07-21 | End: 2021-07-21

## 2021-07-21 RX ADMIN — Medication 6: at 16:40

## 2021-07-21 RX ADMIN — Medication 4 UNIT(S): at 16:40

## 2021-07-21 RX ADMIN — Medication 300 MILLIGRAM(S): at 18:26

## 2021-07-21 RX ADMIN — PIPERACILLIN AND TAZOBACTAM 25 GRAM(S): 4; .5 INJECTION, POWDER, LYOPHILIZED, FOR SOLUTION INTRAVENOUS at 14:51

## 2021-07-21 RX ADMIN — ATORVASTATIN CALCIUM 80 MILLIGRAM(S): 80 TABLET, FILM COATED ORAL at 21:40

## 2021-07-21 RX ADMIN — PIPERACILLIN AND TAZOBACTAM 25 GRAM(S): 4; .5 INJECTION, POWDER, LYOPHILIZED, FOR SOLUTION INTRAVENOUS at 21:42

## 2021-07-21 RX ADMIN — Medication 300 MILLIGRAM(S): at 05:02

## 2021-07-21 RX ADMIN — LISINOPRIL 5 MILLIGRAM(S): 2.5 TABLET ORAL at 05:02

## 2021-07-21 RX ADMIN — PIPERACILLIN AND TAZOBACTAM 25 GRAM(S): 4; .5 INJECTION, POWDER, LYOPHILIZED, FOR SOLUTION INTRAVENOUS at 07:04

## 2021-07-21 RX ADMIN — HEPARIN SODIUM 5000 UNIT(S): 5000 INJECTION INTRAVENOUS; SUBCUTANEOUS at 21:41

## 2021-07-21 RX ADMIN — HEPARIN SODIUM 5000 UNIT(S): 5000 INJECTION INTRAVENOUS; SUBCUTANEOUS at 13:12

## 2021-07-21 RX ADMIN — INSULIN GLARGINE 10 UNIT(S): 100 INJECTION, SOLUTION SUBCUTANEOUS at 22:17

## 2021-07-21 RX ADMIN — Medication 30 MILLIGRAM(S): at 05:03

## 2021-07-21 RX ADMIN — Medication 25 MILLIGRAM(S): at 05:02

## 2021-07-21 RX ADMIN — HEPARIN SODIUM 5000 UNIT(S): 5000 INJECTION INTRAVENOUS; SUBCUTANEOUS at 05:03

## 2021-07-21 RX ADMIN — Medication 8: at 11:56

## 2021-07-21 RX ADMIN — SENNA PLUS 2 TABLET(S): 8.6 TABLET ORAL at 21:40

## 2021-07-21 RX ADMIN — Medication 4: at 22:17

## 2021-07-21 RX ADMIN — Medication 6: at 07:37

## 2021-07-21 NOTE — ADVANCED PRACTICE NURSE CONSULT - ASSESSMENT
General: A&O x 4, ambulates independently, continent of urine and stool. Skin warm, dry.    Scrotal surgical wound s/p scrotal exploration- [id number]- 90% pink-flat minimally moist base, 10% scattered fibrin. With palpation tissue feels thin and friable; would hold NPWT/VAC treatement until there is a thicker granular base. Periwound skin/scrotum with edema, erythema, and mild increased warmth. Scant serofibrinous drainage. Goals of care: maintain moist environment to promote wound healing, provide antimicrobial support, pack areas of dead space, protect periwound skin.

## 2021-07-21 NOTE — ADVANCED PRACTICE NURSE CONSULT - RECOMMEDATIONS
Follow up with Urology as instructed upon discharge.    Topical Recommendations:  Scrotal surgical wound s/p scrotal exploration- cleanse wound and periwound skin with NS. Pat dry. Apply Liquid barrier film to periwound skin. Pack areas of dead space with Silvasorb impregnated 2" Kerlix wrap, cover with 4x4 gauze and paper tape. Secondary securement with stretch underwear. Change dressing daily and prn if soiled/compromised.    Discussed plan and finding with Urology DIANNA Guerrero, patient and patient's spouse at bedside. Urology will reconsult wound care team once there is a more significant granular base to allow for safe placement of NPWT/VAC therapy. All questions and concerns addressed.    Please contact Wound Care Service Line if we can be of further assistance (ext [phone]).

## 2021-07-21 NOTE — ADVANCED PRACTICE NURSE CONSULT - REASON FOR CONSULT
Patient seen on skin care rounds after wound care referral received for assessment of scrotal surgical wound, possible NPWT/VAC placement and/or recommendations of topical management. 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 Pending tissue cultures. WBC 11.88 k/uL, H/H 11.3/35.1, Plt 260, INR 1.26. Patient followed by Urology.

## 2021-07-22 ENCOUNTER — TRANSCRIPTION ENCOUNTER (OUTPATIENT)
Age: 71
End: 2021-07-22

## 2021-07-22 ENCOUNTER — NON-APPOINTMENT (OUTPATIENT)
Age: 71
End: 2021-07-22

## 2021-07-22 LAB
-  AMPICILLIN: SIGNIFICANT CHANGE UP
-  CIPROFLOXACIN: SIGNIFICANT CHANGE UP
-  LEVOFLOXACIN: SIGNIFICANT CHANGE UP
-  NITROFURANTOIN: SIGNIFICANT CHANGE UP
-  TETRACYCLINE: SIGNIFICANT CHANGE UP
-  VANCOMYCIN: SIGNIFICANT CHANGE UP
ANION GAP SERPL CALC-SCNC: 15 MMOL/L — HIGH (ref 7–14)
BUN SERPL-MCNC: 10 MG/DL — SIGNIFICANT CHANGE UP (ref 7–23)
CALCIUM SERPL-MCNC: 9.4 MG/DL — SIGNIFICANT CHANGE UP (ref 8.4–10.5)
CHLORIDE SERPL-SCNC: 98 MMOL/L — SIGNIFICANT CHANGE UP (ref 98–107)
CO2 SERPL-SCNC: 22 MMOL/L — SIGNIFICANT CHANGE UP (ref 22–31)
CREAT SERPL-MCNC: 0.47 MG/DL — LOW (ref 0.5–1.3)
CULTURE RESULTS: SIGNIFICANT CHANGE UP
GLUCOSE BLDC GLUCOMTR-MCNC: 229 MG/DL — HIGH (ref 70–99)
GLUCOSE BLDC GLUCOMTR-MCNC: 252 MG/DL — HIGH (ref 70–99)
GLUCOSE BLDC GLUCOMTR-MCNC: 264 MG/DL — HIGH (ref 70–99)
GLUCOSE BLDC GLUCOMTR-MCNC: 374 MG/DL — HIGH (ref 70–99)
GLUCOSE BLDC GLUCOMTR-MCNC: 407 MG/DL — HIGH (ref 70–99)
GLUCOSE SERPL-MCNC: 263 MG/DL — HIGH (ref 70–99)
METHOD TYPE: SIGNIFICANT CHANGE UP
ORGANISM # SPEC MICROSCOPIC CNT: SIGNIFICANT CHANGE UP
ORGANISM # SPEC MICROSCOPIC CNT: SIGNIFICANT CHANGE UP
POTASSIUM SERPL-MCNC: 3.6 MMOL/L — SIGNIFICANT CHANGE UP (ref 3.5–5.3)
POTASSIUM SERPL-SCNC: 3.6 MMOL/L — SIGNIFICANT CHANGE UP (ref 3.5–5.3)
SODIUM SERPL-SCNC: 135 MMOL/L — SIGNIFICANT CHANGE UP (ref 135–145)
SPECIMEN SOURCE: SIGNIFICANT CHANGE UP
VANCOMYCIN TROUGH SERPL-MCNC: 7.4 UG/ML — LOW (ref 10–20)

## 2021-07-22 PROCEDURE — 99231 SBSQ HOSP IP/OBS SF/LOW 25: CPT

## 2021-07-22 PROCEDURE — 99233 SBSQ HOSP IP/OBS HIGH 50: CPT

## 2021-07-22 RX ORDER — OXYCODONE HYDROCHLORIDE 5 MG/1
10 TABLET ORAL EVERY 4 HOURS
Refills: 0 | Status: DISCONTINUED | OUTPATIENT
Start: 2021-07-22 | End: 2021-07-23

## 2021-07-22 RX ORDER — INSULIN GLARGINE 100 [IU]/ML
15 INJECTION, SOLUTION SUBCUTANEOUS AT BEDTIME
Refills: 0 | Status: DISCONTINUED | OUTPATIENT
Start: 2021-07-22 | End: 2021-07-23

## 2021-07-22 RX ORDER — VANCOMYCIN HCL 1 G
1500 VIAL (EA) INTRAVENOUS EVERY 8 HOURS
Refills: 0 | Status: DISCONTINUED | OUTPATIENT
Start: 2021-07-22 | End: 2021-07-23

## 2021-07-22 RX ORDER — REPAGLINIDE 1 MG/1
1 TABLET ORAL
Qty: 90 | Refills: 0
Start: 2021-07-22 | End: 2021-08-20

## 2021-07-22 RX ORDER — EMPAGLIFLOZIN 10 MG/1
1 TABLET, FILM COATED ORAL
Qty: 0 | Refills: 0 | DISCHARGE

## 2021-07-22 RX ORDER — GLIMEPIRIDE 1 MG
1 TABLET ORAL
Qty: 0 | Refills: 0 | DISCHARGE

## 2021-07-22 RX ORDER — OXYCODONE HYDROCHLORIDE 5 MG/1
5 TABLET ORAL EVERY 4 HOURS
Refills: 0 | Status: DISCONTINUED | OUTPATIENT
Start: 2021-07-22 | End: 2021-07-23

## 2021-07-22 RX ORDER — VANCOMYCIN HCL 1 G
1750 VIAL (EA) INTRAVENOUS EVERY 12 HOURS
Refills: 0 | Status: DISCONTINUED | OUTPATIENT
Start: 2021-07-22 | End: 2021-07-22

## 2021-07-22 RX ORDER — OXYCODONE HYDROCHLORIDE 5 MG/1
1 TABLET ORAL
Qty: 12 | Refills: 0
Start: 2021-07-22

## 2021-07-22 RX ORDER — LIDOCAINE HCL 20 MG/ML
20 VIAL (ML) INJECTION ONCE
Refills: 0 | Status: COMPLETED | OUTPATIENT
Start: 2021-07-22 | End: 2021-07-22

## 2021-07-22 RX ADMIN — Medication 4 UNIT(S): at 07:32

## 2021-07-22 RX ADMIN — HEPARIN SODIUM 5000 UNIT(S): 5000 INJECTION INTRAVENOUS; SUBCUTANEOUS at 22:49

## 2021-07-22 RX ADMIN — ATORVASTATIN CALCIUM 80 MILLIGRAM(S): 80 TABLET, FILM COATED ORAL at 22:49

## 2021-07-22 RX ADMIN — Medication 4: at 16:36

## 2021-07-22 RX ADMIN — Medication 10: at 12:32

## 2021-07-22 RX ADMIN — Medication 25 MILLIGRAM(S): at 05:27

## 2021-07-22 RX ADMIN — LISINOPRIL 5 MILLIGRAM(S): 2.5 TABLET ORAL at 05:27

## 2021-07-22 RX ADMIN — Medication 20 MILLILITER(S): at 12:30

## 2021-07-22 RX ADMIN — Medication 2: at 22:49

## 2021-07-22 RX ADMIN — Medication 6: at 07:32

## 2021-07-22 RX ADMIN — INSULIN GLARGINE 15 UNIT(S): 100 INJECTION, SOLUTION SUBCUTANEOUS at 22:50

## 2021-07-22 RX ADMIN — HEPARIN SODIUM 5000 UNIT(S): 5000 INJECTION INTRAVENOUS; SUBCUTANEOUS at 05:27

## 2021-07-22 RX ADMIN — Medication 300 MILLIGRAM(S): at 22:50

## 2021-07-22 RX ADMIN — PIPERACILLIN AND TAZOBACTAM 25 GRAM(S): 4; .5 INJECTION, POWDER, LYOPHILIZED, FOR SOLUTION INTRAVENOUS at 23:02

## 2021-07-22 RX ADMIN — Medication 4 UNIT(S): at 16:37

## 2021-07-22 RX ADMIN — PIPERACILLIN AND TAZOBACTAM 25 GRAM(S): 4; .5 INJECTION, POWDER, LYOPHILIZED, FOR SOLUTION INTRAVENOUS at 05:32

## 2021-07-22 RX ADMIN — HEPARIN SODIUM 5000 UNIT(S): 5000 INJECTION INTRAVENOUS; SUBCUTANEOUS at 15:14

## 2021-07-22 RX ADMIN — Medication 30 MILLIGRAM(S): at 05:27

## 2021-07-22 RX ADMIN — Medication 250 MILLIGRAM(S): at 10:48

## 2021-07-22 RX ADMIN — PIPERACILLIN AND TAZOBACTAM 25 GRAM(S): 4; .5 INJECTION, POWDER, LYOPHILIZED, FOR SOLUTION INTRAVENOUS at 15:14

## 2021-07-22 NOTE — DISCHARGE NOTE PROVIDER - NSDCMRMEDTOKEN_GEN_ALL_CORE_FT
acetaminophen 325 mg oral tablet: 3 tablets every 6 hours as needed for mild to moderate pain  amoxicillin-clavulanate 875 mg-125 mg oral tablet: 1 tab(s) orally every 12 hours   benazepril-hydrochlorothiazide 20 mg-25 mg oral tablet: 1 tab(s) orally once a day  glimepiride 1 mg oral tablet: 1 tab(s) orally 2 times a day NEW DOSE  Janumet 50 mg-1000 mg oral tablet: 1 tab(s) orally 2 times a day  MiraLax oral powder for reconstitution: 1 packet(s) orally once a day as needed for constipation  NIFEdipine 30 mg oral tablet, extended release: 1 tab(s) orally once a day  oxyCODONE 5 mg oral tablet: 1 tablet every 6 hours as needed for severe pain MDD:4  repaglinide 1 mg oral tablet: 1 tab(s) orally 3 times a day (before meals)   rosuvastatin 20 mg oral tablet: 1 tab(s) orally once a day  senna oral tablet: 2 tab(s) orally once a day (at bedtime)

## 2021-07-22 NOTE — DISCHARGE NOTE NURSING/CASE MANAGEMENT/SOCIAL WORK - PATIENT PORTAL LINK FT
You can access the FollowMyHealth Patient Portal offered by [facility] by registering at the following website: [email or web address]. By joining [facility]’s FollowMyHealth portal, you will also be able to view your health information using other applications (apps) compatible with our system.

## 2021-07-22 NOTE — DISCHARGE NOTE PROVIDER - NSDCFUSCHEDAPPT_GEN_ALL_CORE_FT
ANGELLA COHN ; 07/28/2021 ; NPP Urology [address] [place] [address] ANGELLA COHN ; 08/09/2021 ; NPP Rad  [facility]  ANGELLA COHN ; 08/09/2021 ; NPP Urology [address] [facility]

## 2021-07-22 NOTE — DISCHARGE NOTE PROVIDER - HOSPITAL COURSE
71 yo M s/p SPP/diverticulectomy on 6/29/21 presented to [facility] 7/18 r/o Bobby's, taken emergently to the OR, however no signs or Bobby's, I&D performed and wound packed, started on zoysn/vanco.  Pt remained afebrile and hemodynamically stable for the duration of his hospitalization.  Ucx Efaecalis, Wcx NGTD, Bcx NGTD.  Wound consult not amenable to VAC. Co-managed with hospitalist service and DM meds changed on d/c. Bedside closure of I&D site performed on 7/22.  Pt d/c on augmentin to f/u Dr. Davenport.  I-stop checked.       69 yo M s/p SPP/diverticulectomy on 6/29/21 presented to [facility] 7/18 r/o Bobby's, taken emergently to the OR, I&D performed and wound packed, started on zoysn/vanco.  Pt remained afebrile and hemodynamically stable for the duration of his hospitalization.  Ucx Efaecalis, Wcx NGTD, Bcx NGTD.  Wound consult not amenable to VAC. Co-managed with hospitalist service and DM meds changed on d/c. Bedside closure of I&D site performed on 7/22.  Pt d/c on augmentin to f/u Dr. Davenport.  I-stop checked.

## 2021-07-22 NOTE — DISCHARGE NOTE PROVIDER - NSDCCPCAREPLAN_GEN_ALL_CORE_FT
PRINCIPAL DISCHARGE DIAGNOSIS  Diagnosis: Abdominal pain  Assessment and Plan of Treatment: Drink plenty of fluids  Wear a pad under scrotum and tight underwear to hold it  Make appt for f/u

## 2021-07-22 NOTE — DISCHARGE NOTE NURSING/CASE MANAGEMENT/SOCIAL WORK - NSDPDISTO_GEN_ALL_CORE
Home stable, tolerating diet, scrotal sutures in place with ABD pad,  clean dry and intact, oob, tolerating diet/Home

## 2021-07-22 NOTE — DISCHARGE NOTE NURSING/CASE MANAGEMENT/SOCIAL WORK - NSDCPNINST_GEN_ALL_CORE
Notify Dr Davenport if you experience any increase in pain not relieved with medication, any redness, drainage or swelling around incision or any fever >100.5.  Drink plenty of fluids.  No heavy lifting or straining.  Use over the counter stool softeners to assist with constipation.   Notify Dr Davenport if you experience any increase in pain not relieved with medication, any redness, drainage or swelling around incision or any fever >100.5.  Drink plenty of fluids.  No heavy lifting or straining.  Use over the counter stool softeners to assist with constipation.  Continue to wear ABD pad and underwear for support.

## 2021-07-22 NOTE — PROVIDER CONTACT NOTE (OTHER) - ASSESSMENT
patients BG was 407. RN immediately rechecked sugar and the repeat was 374  patient ate a sandwich approx one hour ago when family arrived and forgot to call RN for insulin

## 2021-07-23 VITALS
TEMPERATURE: 99 F | OXYGEN SATURATION: 98 % | HEART RATE: 74 BPM | RESPIRATION RATE: 17 BRPM | DIASTOLIC BLOOD PRESSURE: 69 MMHG | SYSTOLIC BLOOD PRESSURE: 126 MMHG

## 2021-07-23 LAB
-  AMPICILLIN: SIGNIFICANT CHANGE UP
-  TETRACYCLINE: SIGNIFICANT CHANGE UP
-  VANCOMYCIN: SIGNIFICANT CHANGE UP
GLUCOSE BLDC GLUCOMTR-MCNC: 279 MG/DL — HIGH (ref 70–99)
GLUCOSE BLDC GLUCOMTR-MCNC: 305 MG/DL — HIGH (ref 70–99)
METHOD TYPE: SIGNIFICANT CHANGE UP

## 2021-07-23 PROCEDURE — 99232 SBSQ HOSP IP/OBS MODERATE 35: CPT

## 2021-07-23 PROCEDURE — 99238 HOSP IP/OBS DSCHRG MGMT 30/<: CPT

## 2021-07-23 RX ADMIN — Medication 25 MILLIGRAM(S): at 05:36

## 2021-07-23 RX ADMIN — Medication 4 UNIT(S): at 07:32

## 2021-07-23 RX ADMIN — LISINOPRIL 5 MILLIGRAM(S): 2.5 TABLET ORAL at 05:36

## 2021-07-23 RX ADMIN — PIPERACILLIN AND TAZOBACTAM 25 GRAM(S): 4; .5 INJECTION, POWDER, LYOPHILIZED, FOR SOLUTION INTRAVENOUS at 07:33

## 2021-07-23 RX ADMIN — Medication 30 MILLIGRAM(S): at 05:36

## 2021-07-23 RX ADMIN — Medication 6: at 11:19

## 2021-07-23 RX ADMIN — Medication 8: at 07:32

## 2021-07-23 RX ADMIN — HEPARIN SODIUM 5000 UNIT(S): 5000 INJECTION INTRAVENOUS; SUBCUTANEOUS at 05:36

## 2021-07-23 RX ADMIN — Medication 300 MILLIGRAM(S): at 05:36

## 2021-07-23 NOTE — PROGRESS NOTE ADULT - PROBLEM SELECTOR PLAN 1
Due to Bobby's gangrene, s/p scrotal exploration in OR. Doing well con Zosyn and Vanco  -Management as per   -Cont Zosyn/Vanco  -Check Vanco trough  -F/U wound cx and blood cx
Due to Bobby's gangrene, s/p scrotal exploration in OR. Doing well con Zosyn and Vanco  -Management as per   - wound cx and blood cx NGTD  - antibiotics as per  augmentin on discharge
Due to Bobby's gangrene, s/p scrotal exploration in OR. Doing well con Zosyn and Vanco  -Management as per   -Cont Zosyn/Vanco  -Check Vanco trough  -F/U wound cx and blood cx
Due to Bobby's gangrene, s/p scrotal exploration in OR. Doing well con Zosyn and Vanco  -Management as per   -Cont Zosyn/Vanco  -Check Vanco trough  -F/U wound cx and blood cx

## 2021-07-23 NOTE — PROGRESS NOTE ADULT - ATTENDING COMMENTS
Pt seen and examined this am. Incision healing well. Pain decreased. Plan on d/.c home.
Pt seen and examined. Pain decreased. No change to scrotal exam to suggest on going infection. Plan on cont abx. F/u cx. D/c demarcus today.
Pt seen and examined. Tissue culture negative. Plan on wound closure as not ammenable to vac. Will transition to po abx and possible d/c home today.
Incision appears healthy. Tissue culture prelim negative. Pts pain improving. UCx with gram pos, awaiting spec and sensis. Will consult wound care for possible vac.
pt seen early this am. events as outlined. pt clinically improving

## 2021-07-23 NOTE — PROGRESS NOTE ADULT - SUBJECTIVE AND OBJECTIVE BOX
ANESTHESIA POSTOP CHECK    70y Male POSTOP DAY 1 S/P scrotal exploration    Vital Signs Last 24 Hrs  T(C): 37 (20 Jul 2021 08:58), Max: 37.2 (19 Jul 2021 17:30)  T(F): 98.6 (20 Jul 2021 08:58), Max: 98.9 (19 Jul 2021 17:30)  HR: 66 (20 Jul 2021 08:58) (66 - 90)  BP: 139/76 (20 Jul 2021 08:58) (115/63 - 147/65)  BP(mean): --  RR: 18 (20 Jul 2021 08:58) (17 - 18)  SpO2: 95% (20 Jul 2021 08:58) (95% - 98%)  I&O's Summary    19 Jul 2021 07:01  -  20 Jul 2021 07:00  --------------------------------------------------------  IN: 1980 mL / OUT: 4000 mL / NET: -2020 mL    20 Jul 2021 07:01  -  20 Jul 2021 11:08  --------------------------------------------------------  IN: 360 mL / OUT: 1600 mL / NET: -1240 mL        [ x] NO APPARENT ANESTHESIA COMPLICATIONS      Comments: 
POD #2  Afeb 139/75 87 95%RA    Pt has no c/o  Abd- soft NT ND   Scrotal dressing C&D - changed last nite  Void - 2L
Overnight events:  None,, remained afebrile    Subjective:  Pt offers no complaints    Objective:    Vital signs  T(C): , Max: 37.1 (07-21-21 @ 09:30)  HR: 81 (07-22-21 @ 05:19)  BP: 139/81 (07-22-21 @ 05:19)  SpO2: 98% (07-22-21 @ 05:19)  Wt(kg): --    Output   Void: 1450  07-21 @ 07:01  -  07-22 @ 07:00  --------------------------------------------------------  IN: 0 mL / OUT: 3050 mL / NET: -3050 mL        Gen: NAD  Abd: soft, nontender  : incision with granulation tissue, serous drainage, no odor, no purulence, no cellulitis or crepitus    Labs: pending            Urine Cx:   Culture - Tissue with Gram Stain (07.19.21 @ 21:16)    Gram Stain:   Rare polymorphonuclear leukocytes seen per low power field  No organisms seen per oil power field    Specimen Source: .Tissue Scrotal Wall    Culture Results:   No growth    Culture - Blood (07.18.21 @ 23:04)    Specimen Source: .Blood Blood-Peripheral    Culture Results:   No growth to date.    Culture - Urine (07.18.21 @ 21:13)    -  Ampicillin: S <=2 Predicts results to ampicillin/sulbactam, amoxacillin-clavulanate and  piperacillin-tazobactam.    -  Ciprofloxacin: S <=1    -  Levofloxacin: S 2    -  Nitrofurantoin: S <=32 Should not be used to treat pyelonephritis.    -  Tetra/Doxy: R >8    -  Vancomycin: S 2    Specimen Source: .Urine Clean Catch (Midstream)    Culture Results:   >100,000 CFU/ml Enterococcus faecalis    Organism Identification: Enterococcus faecalis    Organism: Enterococcus faecalis    Method Type: OFELIA      
POD #1  Afeb 147/65 77 96%RA    Pt has no c/o  Abd- soft NT ND   Manzano 2500  Scrotum - wound packing changed last nite; clean and dry  Vanco trough 5.5
POD #4  Afeb 134/71 75 99%RA    Pt has no c/o  Abd- soft NT ND  Scrotal incision C&D  Void 1320
Patient is a 70y old  Male who presents with a chief complaint of Abscess (22 Jul 2021 12:04)      SUBJECTIVE / OVERNIGHT EVENTS: doing well plans for discharge by  today   ADDITIONAL REVIEW OF SYSTEMS: denies N/V    MEDICATIONS  (STANDING):  atorvastatin 80 milliGRAM(s) Oral at bedtime  dextrose 40% Gel 15 Gram(s) Oral once  dextrose 5%. 1000 milliLiter(s) (100 mL/Hr) IV Continuous <Continuous>  dextrose 5%. 1000 milliLiter(s) (50 mL/Hr) IV Continuous <Continuous>  dextrose 50% Injectable 25 Gram(s) IV Push once  dextrose 50% Injectable 12.5 Gram(s) IV Push once  dextrose 50% Injectable 25 Gram(s) IV Push once  glucagon  Injectable 1 milliGRAM(s) IntraMuscular once  heparin   Injectable 5000 Unit(s) SubCutaneous every 8 hours  hydrochlorothiazide 25 milliGRAM(s) Oral every 24 hours  insulin glargine Injectable (LANTUS) 15 Unit(s) SubCutaneous at bedtime  insulin lispro (ADMELOG) corrective regimen sliding scale   SubCutaneous three times a day before meals  insulin lispro (ADMELOG) corrective regimen sliding scale   SubCutaneous at bedtime  insulin lispro Injectable (ADMELOG) 4 Unit(s) SubCutaneous three times a day before meals  lisinopril 5 milliGRAM(s) Oral daily  NIFEdipine XL 30 milliGRAM(s) Oral daily  piperacillin/tazobactam IVPB.. 3.375 Gram(s) IV Intermittent every 8 hours  senna 2 Tablet(s) Oral at bedtime  vancomycin  IVPB 1500 milliGRAM(s) IV Intermittent every 8 hours    MEDICATIONS  (PRN):  acetaminophen   Tablet .. 975 milliGRAM(s) Oral every 6 hours PRN Mild Pain (1 - 3)  oxybutynin 5 milliGRAM(s) Oral three times a day PRN Bladder spasms  oxyCODONE    IR 5 milliGRAM(s) Oral every 4 hours PRN Moderate Pain (4 - 6)  oxyCODONE    IR 10 milliGRAM(s) Oral every 4 hours PRN Severe Pain (7 - 10)      CAPILLARY BLOOD GLUCOSE      POCT Blood Glucose.: 279 mg/dL (23 Jul 2021 11:15)  POCT Blood Glucose.: 305 mg/dL (23 Jul 2021 07:20)  POCT Blood Glucose.: 252 mg/dL (22 Jul 2021 22:32)  POCT Blood Glucose.: 229 mg/dL (22 Jul 2021 16:30)  POCT Blood Glucose.: 374 mg/dL (22 Jul 2021 12:29)  POCT Blood Glucose.: 407 mg/dL (22 Jul 2021 12:24)    I&O's Summary    22 Jul 2021 07:01  -  23 Jul 2021 07:00  --------------------------------------------------------  IN: 0 mL / OUT: 3395 mL / NET: -3395 mL    23 Jul 2021 07:01  -  23 Jul 2021 11:30  --------------------------------------------------------  IN: 0 mL / OUT: 1650 mL / NET: -1650 mL        PHYSICAL EXAM:  Vital Signs Last 24 Hrs  T(C): 37.3 (23 Jul 2021 09:17), Max: 37.3 (23 Jul 2021 09:17)  T(F): 99.2 (23 Jul 2021 09:17), Max: 99.2 (23 Jul 2021 09:17)  HR: 74 (23 Jul 2021 09:17) (74 - 90)  BP: 126/69 (23 Jul 2021 09:17) (119/60 - 146/79)  BP(mean): --  RR: 17 (23 Jul 2021 09:17) (16 - 18)  SpO2: 98% (23 Jul 2021 09:17) (98% - 99%)  CONSTITUTIONAL: NAD, well-developed, well-groomed  EYES: PERRLA; conjunctiva and sclera clear  ENMT: Moist oral mucosa, no pharyngeal injection or exudates; normal dentition  NECK: Supple, no palpable masses; no thyromegaly  RESPIRATORY: Normal respiratory effort; lungs are clear to auscultation bilaterally  CARDIOVASCULAR: Regular rate and rhythm, normal S1 and S2, no murmur/rub/gallop; No lower extremity edema; Peripheral pulses are 2+ bilaterally  ABDOMEN: Nontender to palpation, normoactive bowel sounds, no rebound/guarding; No hepatosplenomegaly  MUSCULOSKELETAL:  Normal gait; no clubbing or cyanosis of digits; no joint swelling or tenderness to palpation  PSYCH: A+O to person, place, and time; affect appropriate  NEUROLOGY: CN 2-12 are intact and symmetric; no gross sensory deficits   SKIN: No rashes; no palpable lesions    LABS:    07-22    135  |  98  |  10  ----------------------------<  263<H>  3.6   |  22  |  0.47<L>    Ca    9.4      22 Jul 2021 06:46                  RADIOLOGY & ADDITIONAL TESTS:  Results Reviewed:   Imaging Personally Reviewed:  Electrocardiogram Personally Reviewed:    COORDINATION OF CARE:  Care Discussed with Consultants/Other Providers [Y/N]: Yes ALEYDA  Prior or Outpatient Records Reviewed [Y/N]:  
Post - op:    Tmax 102.2 pre-op  Afeb 111/58 74 97%RA  Pt has no c/o  Abd- soft NT ND   Wound under scrotum - dressing intact  Manzano 400
Patient is a 70y old  Male who presents with a chief complaint of     SUBJECTIVE / OVERNIGHT EVENTS: No complaints today.     MEDICATIONS  (STANDING):  atorvastatin 80 milliGRAM(s) Oral at bedtime  dextrose 40% Gel 15 Gram(s) Oral once  dextrose 5%. 1000 milliLiter(s) (50 mL/Hr) IV Continuous <Continuous>  dextrose 5%. 1000 milliLiter(s) (100 mL/Hr) IV Continuous <Continuous>  dextrose 50% Injectable 25 Gram(s) IV Push once  dextrose 50% Injectable 12.5 Gram(s) IV Push once  dextrose 50% Injectable 25 Gram(s) IV Push once  glucagon  Injectable 1 milliGRAM(s) IntraMuscular once  heparin   Injectable 5000 Unit(s) SubCutaneous every 8 hours  hydrochlorothiazide 25 milliGRAM(s) Oral every 24 hours  insulin glargine Injectable (LANTUS) 10 Unit(s) SubCutaneous at bedtime  insulin lispro (ADMELOG) corrective regimen sliding scale   SubCutaneous three times a day before meals  insulin lispro (ADMELOG) corrective regimen sliding scale   SubCutaneous at bedtime  lisinopril 5 milliGRAM(s) Oral daily  NIFEdipine XL 30 milliGRAM(s) Oral daily  piperacillin/tazobactam IVPB.. 3.375 Gram(s) IV Intermittent every 8 hours  senna 2 Tablet(s) Oral at bedtime  vancomycin  IVPB      vancomycin  IVPB 1500 milliGRAM(s) IV Intermittent every 12 hours    MEDICATIONS  (PRN):  acetaminophen   Tablet .. 975 milliGRAM(s) Oral every 6 hours PRN Mild Pain (1 - 3)  HYDROmorphone  Injectable 0.5 milliGRAM(s) IV Push every 4 hours PRN Moderate Pain (4 - 6)  HYDROmorphone  Injectable 1 milliGRAM(s) IV Push every 4 hours PRN Severe Pain (7 - 10)  oxybutynin 5 milliGRAM(s) Oral three times a day PRN Bladder spasms      Vital Signs Last 24 Hrs  T(C): 37.1 (21 Jul 2021 09:30), Max: 37.2 (21 Jul 2021 01:26)  T(F): 98.8 (21 Jul 2021 09:30), Max: 98.9 (21 Jul 2021 01:26)  HR: 78 (21 Jul 2021 09:30) (69 - 88)  BP: 132/71 (21 Jul 2021 09:30) (108/63 - 145/65)  BP(mean): --  RR: 17 (21 Jul 2021 09:30) (16 - 18)  SpO2: 97% (21 Jul 2021 09:30) (95% - 98%)  CAPILLARY BLOOD GLUCOSE      POCT Blood Glucose.: 261 mg/dL (21 Jul 2021 07:34)  POCT Blood Glucose.: 295 mg/dL (20 Jul 2021 22:02)  POCT Blood Glucose.: 226 mg/dL (20 Jul 2021 16:34)  POCT Blood Glucose.: 297 mg/dL (20 Jul 2021 11:28)    I&O's Summary    20 Jul 2021 07:01  -  21 Jul 2021 07:00  --------------------------------------------------------  IN: 1955 mL / OUT: 5975 mL / NET: -4020 mL    21 Jul 2021 07:01  -  21 Jul 2021 10:43  --------------------------------------------------------  IN: 0 mL / OUT: 700 mL / NET: -700 mL        PHYSICAL EXAM:  GENERAL: NAD, well-developed  HEAD:  Atraumatic, Normocephalic  EYES: EOMI, PERRLA, conjunctiva and sclera clear  NECK: Supple, No JVD  CHEST/LUNG: Clear to auscultation bilaterally; No wheeze  HEART: Regular rate and rhythm; No murmurs, rubs, or gallops  ABDOMEN: Soft, Nontender, Nondistended; Bowel sounds present  EXTREMITIES:  2+ Peripheral Pulses, No clubbing, cyanosis, or edema  PSYCH: AAOx3  NEUROLOGY: non-focal  SKIN: No rashes or lesions    LABS:                        11.3   11.88 )-----------( 260      ( 20 Jul 2021 06:28 )             35.1     07-20    139  |  105  |  9   ----------------------------<  204<H>  3.5   |  22  |  0.50    Ca    8.4      20 Jul 2021 06:28                RADIOLOGY & ADDITIONAL TESTS:    Imaging Personally Reviewed:    Consultant(s) Notes Reviewed:      Care Discussed with Consultants/Other Providers:
Patient is a 70y old  Male who presents with a chief complaint of     SUBJECTIVE / OVERNIGHT EVENTS: Pt has no complaints. OOB in Chair     MEDICATIONS  (STANDING):  atorvastatin 80 milliGRAM(s) Oral at bedtime  dextrose 40% Gel 15 Gram(s) Oral once  dextrose 5% + sodium chloride 0.45%. 1000 milliLiter(s) (75 mL/Hr) IV Continuous <Continuous>  dextrose 5%. 1000 milliLiter(s) (100 mL/Hr) IV Continuous <Continuous>  dextrose 5%. 1000 milliLiter(s) (50 mL/Hr) IV Continuous <Continuous>  dextrose 50% Injectable 25 Gram(s) IV Push once  dextrose 50% Injectable 12.5 Gram(s) IV Push once  dextrose 50% Injectable 25 Gram(s) IV Push once  glucagon  Injectable 1 milliGRAM(s) IntraMuscular once  heparin   Injectable 5000 Unit(s) SubCutaneous every 8 hours  hydrochlorothiazide 25 milliGRAM(s) Oral every 24 hours  insulin lispro (ADMELOG) corrective regimen sliding scale   SubCutaneous three times a day before meals  insulin lispro (ADMELOG) corrective regimen sliding scale   SubCutaneous at bedtime  lisinopril 5 milliGRAM(s) Oral daily  NIFEdipine XL 30 milliGRAM(s) Oral daily  piperacillin/tazobactam IVPB.. 3.375 Gram(s) IV Intermittent every 8 hours  senna 2 Tablet(s) Oral at bedtime  vancomycin  IVPB 1500 milliGRAM(s) IV Intermittent every 12 hours  vancomycin  IVPB        MEDICATIONS  (PRN):  acetaminophen   Tablet .. 975 milliGRAM(s) Oral every 6 hours PRN Mild Pain (1 - 3)  HYDROmorphone  Injectable 0.5 milliGRAM(s) IV Push every 4 hours PRN Moderate Pain (4 - 6)  HYDROmorphone  Injectable 1 milliGRAM(s) IV Push every 4 hours PRN Severe Pain (7 - 10)  oxybutynin 5 milliGRAM(s) Oral three times a day PRN Bladder spasms      Vital Signs Last 24 Hrs  T(C): 37 (2021 08:58), Max: 37.2 (2021 17:30)  T(F): 98.6 (2021 08:58), Max: 98.9 (2021 17:30)  HR: 66 (2021 08:58) (66 - 90)  BP: 139/76 (2021 08:58) (115/63 - 147/65)  BP(mean): --  RR: 18 (2021 08:58) (17 - 18)  SpO2: 95% (2021 08:58) (95% - 98%)  CAPILLARY BLOOD GLUCOSE      POCT Blood Glucose.: 297 mg/dL (2021 11:28)  POCT Blood Glucose.: 190 mg/dL (2021 07:15)  POCT Blood Glucose.: 215 mg/dL (2021 22:38)  POCT Blood Glucose.: 236 mg/dL (2021 16:32)    I&O's Summary    2021 07:01  -  2021 07:00  --------------------------------------------------------  IN: 1980 mL / OUT: 4000 mL / NET: -2020 mL    2021 07:01  -  2021 12:24  --------------------------------------------------------  IN: 660 mL / OUT: 2300 mL / NET: -1640 mL        PHYSICAL EXAM:  GENERAL: NAD, well-developed  HEAD:  Atraumatic, Normocephalic  EYES: EOMI, PERRLA, conjunctiva and sclera clear  NECK: Supple, No JVD  CHEST/LUNG: Clear to auscultation bilaterally; No wheeze  HEART: Regular rate and rhythm; No murmurs, rubs, or gallops  ABDOMEN: Soft, Nontender, Nondistended; Bowel sounds present  EXTREMITIES:  2+ Peripheral Pulses, No clubbing, cyanosis, or edema  PSYCH: AAOx3  NEUROLOGY: non-focal  SKIN: No rashes or lesions    LABS:                        11.3   11.88 )-----------( 260      ( 2021 06:28 )             35.1     07-20    139  |  105  |  9   ----------------------------<  204<H>  3.5   |  22  |  0.50    Ca    8.4      2021 06:28    TPro  8.3  /  Alb  4.2  /  TBili  0.6  /  DBili  x   /  AST  40  /  ALT  26  /  AlkPhos  50  07-18    PT/INR - ( 2021 23:13 )   PT: 14.3 sec;   INR: 1.26 ratio         PTT - ( 2021 23:13 )  PTT:23.0 sec      Urinalysis Basic - ( 2021 19:22 )    Color: Yellow / Appearance: Turbid / S.028 / pH: x  Gluc: x / Ketone: Large  / Bili: Negative / Urobili: <2 mg/dL   Blood: x / Protein: 100 mg/dL / Nitrite: Negative   Leuk Esterase: Large / RBC: 5 /HPF / WBC >50 /HPF   Sq Epi: x / Non Sq Epi: 4 /HPF / Bacteria: Moderate        RADIOLOGY & ADDITIONAL TESTS:    Imaging Personally Reviewed:    Consultant(s) Notes Reviewed:      Care Discussed with Consultants/Other Providers:
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 1000 milliLiter(s) (100 mL/Hr) IV Continuous <Continuous>  dextrose 5%. 1000 milliLiter(s) (50 mL/Hr) IV Continuous <Continuous>  dextrose 50% Injectable 25 Gram(s) IV Push once  dextrose 50% Injectable 12.5 Gram(s) IV Push once  dextrose 50% Injectable 25 Gram(s) IV Push once  glucagon  Injectable 1 milliGRAM(s) IntraMuscular once  heparin   Injectable 5000 Unit(s) SubCutaneous every 8 hours  hydrochlorothiazide 25 milliGRAM(s) Oral every 24 hours  insulin glargine Injectable (LANTUS) 10 Unit(s) SubCutaneous at bedtime  insulin lispro (ADMELOG) corrective regimen sliding scale   SubCutaneous three times a day before meals  insulin lispro (ADMELOG) corrective regimen sliding scale   SubCutaneous at bedtime  insulin lispro Injectable (ADMELOG) 4 Unit(s) SubCutaneous three times a day before meals  lidocaine 2% Injectable 20 milliLiter(s) Local Injection once  lisinopril 5 milliGRAM(s) Oral daily  NIFEdipine XL 30 milliGRAM(s) Oral daily  piperacillin/tazobactam IVPB.. 3.375 Gram(s) IV Intermittent every 8 hours  senna 2 Tablet(s) Oral at bedtime  vancomycin  IVPB 1750 milliGRAM(s) IV Intermittent every 12 hours    MEDICATIONS  (PRN):  acetaminophen   Tablet .. 975 milliGRAM(s) Oral every 6 hours PRN Mild Pain (1 - 3)  HYDROmorphone  Injectable 0.5 milliGRAM(s) IV Push every 4 hours PRN Moderate Pain (4 - 6)  HYDROmorphone  Injectable 1 milliGRAM(s) IV Push every 4 hours PRN Severe Pain (7 - 10)  oxybutynin 5 milliGRAM(s) Oral three times a day PRN Bladder spasms      Vital Signs Last 24 Hrs  T(C): 36.8 (22 Jul 2021 05:19), Max: 36.9 (21 Jul 2021 17:21)  T(F): 98.2 (22 Jul 2021 05:19), Max: 98.5 (22 Jul 2021 01:34)  HR: 81 (22 Jul 2021 05:19) (74 - 81)  BP: 139/81 (22 Jul 2021 05:19) (128/77 - 150/82)  BP(mean): --  RR: 18 (22 Jul 2021 05:19) (16 - 18)  SpO2: 98% (22 Jul 2021 05:19) (95% - 99%)  CAPILLARY BLOOD GLUCOSE      POCT Blood Glucose.: 264 mg/dL (22 Jul 2021 07:24)  POCT Blood Glucose.: 316 mg/dL (21 Jul 2021 22:10)  POCT Blood Glucose.: 278 mg/dL (21 Jul 2021 16:34)  POCT Blood Glucose.: 349 mg/dL (21 Jul 2021 11:45)    I&O's Summary    21 Jul 2021 07:01  -  22 Jul 2021 07:00  --------------------------------------------------------  IN: 0 mL / OUT: 3050 mL / NET: -3050 mL    22 Jul 2021 07:01  -  22 Jul 2021 09:43  --------------------------------------------------------  IN: 0 mL / OUT: 425 mL / NET: -425 mL        PHYSICAL EXAM:  GENERAL: NAD, well-developed  HEAD:  Atraumatic, Normocephalic  EYES: EOMI, PERRLA, conjunctiva and sclera clear  NECK: Supple, No JVD  CHEST/LUNG: Clear to auscultation bilaterally; No wheeze  HEART: Regular rate and rhythm; No murmurs, rubs, or gallops  ABDOMEN: Soft, Nontender, Nondistended; Bowel sounds present  EXTREMITIES:  2+ Peripheral Pulses, No clubbing, cyanosis, or edema  PSYCH: AAOx3  NEUROLOGY: non-focal  SKIN: No rashes or lesions    LABS:    07-22    135  |  98  |  10  ----------------------------<  263<H>  3.6   |  22  |  0.47<L>    Ca    9.4      22 Jul 2021 06:46                RADIOLOGY & ADDITIONAL TESTS:    Imaging Personally Reviewed:    Consultant(s) Notes Reviewed:      Care Discussed with Consultants/Other Providers:

## 2021-07-23 NOTE — PROGRESS NOTE ADULT - PROBLEM SELECTOR PROBLEM 2
Type 2 diabetes mellitus with other specified complication, without long-term current use of insulin

## 2021-07-23 NOTE — PROGRESS NOTE ADULT - PROBLEM SELECTOR PLAN 2
- FS not well controlled. Pt wants to be taken off Jardance at discharge. FS still not at goals.   - Lantus to 15units QHS  - Admelog 4 units 3X/day before meals   -FSSS with Admelog coverage  -At discharge. Will cont Janumet and Increase Glimepiride 1mg 2X/day and start Prandin 1 mg PO 3X/day 30min prior to each meal   - suspect some level of hyperglycemia due to antibiotics in D5 as A1c-7.4  -F/U with PCP re: adjustment of meds
FS not well controlled. Pt wants to be taken off Jardance at discharge. FS still not at goals. Used 10 units of correctional Admelog since last night   -Increase Lantus to 15units QHS  -Cont. Admelog 4 units 3X/day before meals   -FSSS with Admelog coverage  -At discharge. Will cont Janumet and Increase Glimepiride 1mg 2X/day and start Prandin 1 mg PO 3X/day 30min prior to each meal   -F/U with PCP re: adjustment of meds
FS not well controlled. Pt wants to be taken off Jardance at discharge.  -Add Lantus 10units QHS  -FSSS with Admelog coverage  -At discharge. Will cont Janumet and Increase Glimepiride 1mg 2X/day  -F/U with PCP re: adjustment of meds
FS not well controlled. Pt wants to be taken off Jardance at discharge. FS still not at goals   -Cont, Lantus 10units QHS  -Add Admelog 4 units 3X/day before meals   -FSSS with Admelog coverage  -At discharge. Will cont Janumet and Increase Glimepiride 1mg 2X/day and start Prandin 1 mg PO 3X/day 30min prior to each meal   -F/U with PCP re: adjustment of meds

## 2021-07-23 NOTE — PROGRESS NOTE ADULT - ASSESSMENT
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     
71 yo M s/p SPP/diverticulectomy on 6/29/21 presented to [facility] 7/18 r/o Bobby's, taken emergently to the OR, however no signs or Bobby's, I&D performed and wound packed, started on zoysn/vanco  7/19 - post op; doing well; dressing C&D; packing changed late  7/20 - doing well; vanco trough 5.5; Vanco dose increased  7/21 - if cultures neg will VAC wound; no events  7/22 - per wound specialist area in close proximity to tested and not ready for VAC, will perform bedside closure today  7/23 - incision s/p closure C&D  Plan:  - home today with augmentin      
71 yo M s/p SPP/diverticulectomy on 6/29/21 presented to [facility] 7/18 r/o Bobby's, taken emergently to the OR, however no signs or Bobby's, I&D performed and wound packed, started on zoysn/vanco  7/19 - post op; doing well; dressing C&D; packing changed late  7/20 - doing well; vanco trough 5.5; Vanco dose increased  7/21 - if cultures neg will VAC wound; no events  7/22 - per wound specialist area in close proximity to tested and not ready for VAC, will perform bedside closure today    Plan:  - f/u AM BMP  - f/u vanco level   - SL  - f/u final cultures, UCx Efaec, WCx NGTD, Bcx NGRCTDheck cultures  - f/u medicine, no Jardiance on d/c, will rx prandin  - DVT prophy, IS, OOB, ambulate      
This 69 yo M s/p SPP/diverticulectomy on 6/29/21 presented to [facility] r/o Bobby's  Went to OR last nite for exploration; does not look like Bobby's  7/19 - post op; doing well; dressing C&D  Plan:  - labs  - check cultures  - reg diet
This 69 yo M s/p SPP/diverticulectomy on 6/29/21 presented to [facility] r/o Bobby's  Went to OR last nite for exploration; does not look like Bobby's  7/19 - post op; doing well; dressing C&D; packing changed late  7/20 - doing well; vanco trough 5.5; Vanco dose increased  7/21 - if cultures neg will VAC wound; no events  Plan:  - vanco level tomorrow  - SL  - check cultures  
This 69 yo M s/p SPP/diverticulectomy on 6/29/21 presented to [facility] r/o Bobby's  Went to OR last nite for exploration; does not look like Bobby's  7/19 - post op; doing well; dressing C&D; packing changed late  7/20 - doing well; vanco trough 5.5  Plan:  - increase vanco to 1500mg q12  - labs  - check cultures  
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

## 2021-07-23 NOTE — PROGRESS NOTE ADULT - PROBLEM SELECTOR PLAN 3
BP well controlled.  -Cont Nifedipine and Lisinopril   -Monitor BP

## 2021-07-24 LAB
CULTURE RESULTS: SIGNIFICANT CHANGE UP
ORGANISM # SPEC MICROSCOPIC CNT: SIGNIFICANT CHANGE UP
ORGANISM # SPEC MICROSCOPIC CNT: SIGNIFICANT CHANGE UP
SPECIMEN SOURCE: SIGNIFICANT CHANGE UP

## 2021-07-28 ENCOUNTER — APPOINTMENT (OUTPATIENT)
Dept: UROLOGY | Facility: CLINIC | Age: 71
End: 2021-07-28
Payer: MEDICARE

## 2021-07-28 VITALS — HEART RATE: 90 BPM | SYSTOLIC BLOOD PRESSURE: 133 MMHG | RESPIRATION RATE: 16 BRPM | DIASTOLIC BLOOD PRESSURE: 79 MMHG

## 2021-07-28 DIAGNOSIS — N45.3 EPIDIDYMO-ORCHITIS: ICD-10-CM

## 2021-07-28 PROCEDURE — 99212 OFFICE O/P EST SF 10 MIN: CPT | Mod: 24

## 2021-07-30 LAB
APPEARANCE: ABNORMAL
BACTERIA UR CULT: NORMAL
BACTERIA: NEGATIVE
BILIRUBIN URINE: NEGATIVE
BLOOD URINE: ABNORMAL
COLOR: YELLOW
GLUCOSE QUALITATIVE U: ABNORMAL
HYALINE CASTS: 1 /LPF
KETONES URINE: NEGATIVE
LEUKOCYTE ESTERASE URINE: ABNORMAL
MICROSCOPIC-UA: NORMAL
NITRITE URINE: NEGATIVE
PH URINE: 6
PROTEIN URINE: ABNORMAL
RED BLOOD CELLS URINE: 24 /HPF
SPECIFIC GRAVITY URINE: 1.01
SQUAMOUS EPITHELIAL CELLS: 0 /HPF
UROBILINOGEN URINE: NORMAL
WHITE BLOOD CELLS URINE: 373 /HPF

## 2021-07-30 NOTE — ASSESSMENT
[FreeTextEntry1] : S/p robotic SPP with diverticulectomy. Now with ? early fourniers s/p debirdement\par --UA, UCx\par --COmplete abx\par --F/u in 2 weeks with scrotal US\par  -c/w Lantus 60 units QAM ,Humalog 8 units AC meals  -Adjust Humalog correction scale AC (moderate scale) and Mod HS scale.   -can continue home basal/bolus regimen (lantus/novolog) at discharge  -will follow up with PCP. Dr Derek Krueger-endocrine ([place])  pager: [phone].

## 2021-07-30 NOTE — HISTORY OF PRESENT ILLNESS
[FreeTextEntry1] : 71yo gentleman with cc of luts, BPH and bladder diverticulum. At baseline, pt reports urinary frequency and slow urinary stream. he is going every hour during the day. He is waking up 4-5 times per night to void. He endorses straining. He has occasional urgency. No urinary incontinence. He drinks a lot of water and 1-2 espresso every am. No hx of UTI. Has had hx of retention x1 about 10y ago. Has not been on meds for this until recently starting saw palmetto. Hx of kidney stones s/p ESWL. No bladder stones. No abd surgery. Reviewed imaging, pt with large L bladder tic and enlarged prostate with very significant median lobe. Last week he underwent uncomplicated RAL diverticulectomy and SP suprapubic prostatectomy. \par \par He is overall feeling well. No pain--taking only tylenol. Having BM. Ambulating. Path was benign. \par \par He presented to the ER over a week ago with acute R scrotal pain and nausea and emesis. He was febrile and tachycardic. Scrotal US and subsequent CT showed air in scrotum. Low concern for fourniers based on exam. Given possible early fourniers from imaging. pt taken to OR for debridement. Pt with E faecalis in the urine and in the fluid media of the tissue culture. He is on augmentin based on cultures and will be done shortly. He is feeling better. No fevers or chills. His R testicular pain is improved. \par \par

## 2021-08-09 ENCOUNTER — APPOINTMENT (OUTPATIENT)
Dept: ULTRASOUND IMAGING | Facility: IMAGING CENTER | Age: 71
End: 2021-08-09
Payer: MEDICARE

## 2021-08-09 ENCOUNTER — APPOINTMENT (OUTPATIENT)
Dept: UROLOGY | Facility: CLINIC | Age: 71
End: 2021-08-09
Payer: MEDICARE

## 2021-08-09 ENCOUNTER — OUTPATIENT (OUTPATIENT)
Dept: OUTPATIENT SERVICES | Facility: HOSPITAL | Age: 71
LOS: 1 days | End: 2021-08-09
Payer: MEDICARE

## 2021-08-09 VITALS
TEMPERATURE: 97.5 F | HEART RATE: 93 BPM | RESPIRATION RATE: 16 BRPM | SYSTOLIC BLOOD PRESSURE: 128 MMHG | DIASTOLIC BLOOD PRESSURE: 81 MMHG

## 2021-08-09 DIAGNOSIS — N49.3 FOURNIER GANGRENE: ICD-10-CM

## 2021-08-09 DIAGNOSIS — Z96.659 PRESENCE OF UNSPECIFIED ARTIFICIAL KNEE JOINT: Chronic | ICD-10-CM

## 2021-08-09 PROCEDURE — 76870 US EXAM SCROTUM: CPT | Mod: 26

## 2021-08-09 PROCEDURE — 99213 OFFICE O/P EST LOW 20 MIN: CPT | Mod: 24

## 2021-08-09 PROCEDURE — 76870 US EXAM SCROTUM: CPT

## 2021-08-10 LAB
APPEARANCE: CLEAR
BACTERIA UR CULT: NORMAL
BACTERIA: NEGATIVE
BILIRUBIN URINE: NEGATIVE
BLOOD URINE: NORMAL
COLOR: NORMAL
GLUCOSE QUALITATIVE U: ABNORMAL
HYALINE CASTS: 1 /LPF
KETONES URINE: NEGATIVE
LEUKOCYTE ESTERASE URINE: ABNORMAL
MICROSCOPIC-UA: NORMAL
NITRITE URINE: NEGATIVE
PH URINE: 6
PROTEIN URINE: ABNORMAL
RED BLOOD CELLS URINE: 4 /HPF
SPECIFIC GRAVITY URINE: 1.01
SQUAMOUS EPITHELIAL CELLS: 1 /HPF
UROBILINOGEN URINE: NORMAL
WHITE BLOOD CELLS URINE: 47 /HPF

## 2021-08-10 NOTE — PHYSICAL EXAM
[General Appearance - Well Developed] : well developed [General Appearance - Well Nourished] : well nourished [General Appearance - In No Acute Distress] : no acute distress [Abdomen Soft] : soft [Abdomen Tenderness] : non-tender [Costovertebral Angle Tenderness] : no ~M costovertebral angle tenderness [Edema] : no peripheral edema [Exaggerated Use Of Accessory Muscles For Inspiration] : no accessory muscle use [Oriented To Time, Place, And Person] : oriented to person, place, and time [Normal Station and Gait] : the gait and station were normal for the patient's age [No Focal Deficits] : no focal deficits [FreeTextEntry1] : scrotal incision healing well without erythema, warmth or necrosis but with expected wall thickening, R testis normal in size and no longer with tenderness

## 2021-08-10 NOTE — ASSESSMENT
[FreeTextEntry1] : S/p robotic SPP with diverticulectomy. Admitted with ? early fourniers s/p debirdement. Still with some hyperemia of R testis on US\par --UA, UCx\par --additional 2 weeks abx\par --F/u final US results\par --DM control\par --F/u in 3-4 weeks\par

## 2021-08-10 NOTE — HISTORY OF PRESENT ILLNESS
[FreeTextEntry1] : 69yo gentleman with cc of luts, BPH and bladder diverticulum. At baseline, pt reports urinary frequency and slow urinary stream. he is going every hour during the day. He is waking up 4-5 times per night to void. He endorses straining. He has occasional urgency. No urinary incontinence. He drinks a lot of water and 1-2 espresso every am. No hx of UTI. Has had hx of retention x1 about 10y ago. Has not been on meds for this until recently starting saw palmetto. Hx of kidney stones s/p ESWL. No bladder stones. No abd surgery. Reviewed imaging, pt with large L bladder tic and enlarged prostate with very significant median lobe. Underwent RAL diverticulectomy and SP suprapubic prostatectomy 6/2021. Path was benign. \par \par He presented to last monthwith acute R scrotal pain and nausea and emesis. He was febrile and tachycardic. Clinically appeared to have febrile UTI with R orchitis. However, Scrotal US and subsequent CT showed air in scrotum. Given possible early fourniers from imaging. pt taken to OR for debridement. Pt with E faecalis in the urine and in the fluid media of the tissue culture. He was placed on augmentin based on cultures. He continues to improve. Pain much better. No fever or chills. \par \par Had US today. Reviewed imaging that shows complex R hydrocele and still with hyperemia of R testis. \par \par

## 2021-08-18 LAB
CULTURE RESULTS: SIGNIFICANT CHANGE UP
SPECIMEN SOURCE: SIGNIFICANT CHANGE UP

## 2021-09-08 LAB
CULTURE RESULTS: SIGNIFICANT CHANGE UP
SPECIMEN SOURCE: SIGNIFICANT CHANGE UP

## 2021-09-22 ENCOUNTER — APPOINTMENT (OUTPATIENT)
Dept: UROLOGY | Facility: CLINIC | Age: 71
End: 2021-09-22
Payer: MEDICARE

## 2021-09-22 DIAGNOSIS — N49.3 FOURNIER GANGRENE: ICD-10-CM

## 2021-09-22 PROCEDURE — 99212 OFFICE O/P EST SF 10 MIN: CPT

## 2021-09-23 PROBLEM — N49.3 FOURNIER'S GANGRENE OF SCROTUM: Status: ACTIVE | Noted: 2021-07-28

## 2021-09-23 LAB
APPEARANCE: CLEAR
BACTERIA: NEGATIVE
BILIRUBIN URINE: NEGATIVE
BLOOD URINE: NEGATIVE
COLOR: YELLOW
GLUCOSE QUALITATIVE U: ABNORMAL
HYALINE CASTS: 4 /LPF
KETONES URINE: NEGATIVE
LEUKOCYTE ESTERASE URINE: NEGATIVE
MICROSCOPIC-UA: NORMAL
NITRITE URINE: NEGATIVE
PH URINE: 6.5
PROTEIN URINE: ABNORMAL
RED BLOOD CELLS URINE: 2 /HPF
SPECIFIC GRAVITY URINE: 1.02
SQUAMOUS EPITHELIAL CELLS: 1 /HPF
UROBILINOGEN URINE: NORMAL
WHITE BLOOD CELLS URINE: 7 /HPF

## 2021-09-23 NOTE — PHYSICAL EXAM
[General Appearance - Well Developed] : well developed [General Appearance - Well Nourished] : well nourished [General Appearance - In No Acute Distress] : no acute distress [Abdomen Soft] : soft [Abdomen Tenderness] : non-tender [Costovertebral Angle Tenderness] : no ~M costovertebral angle tenderness [Edema] : no peripheral edema [Exaggerated Use Of Accessory Muscles For Inspiration] : no accessory muscle use [Oriented To Time, Place, And Person] : oriented to person, place, and time [Normal Station and Gait] : the gait and station were normal for the patient's age [No Focal Deficits] : no focal deficits [FreeTextEntry1] : scrotal incision healed without erythema, scrotal wall thickening resolved, B testis normal and non tender

## 2021-09-23 NOTE — ASSESSMENT
[FreeTextEntry1] : S/p robotic SPP with diverticulectomy. Admitted with ? early fourniers s/p debirdement. Now resolved\par --UA, UCx. will call pt with results\par --RTC in 6mo\par

## 2021-09-23 NOTE — HISTORY OF PRESENT ILLNESS
[FreeTextEntry1] : 70yo gentleman with cc of luts, BPH and bladder diverticulum. At baseline, pt reports urinary frequency and slow urinary stream. he is going every hour during the day. He is waking up 4-5 times per night to void. He endorses straining. He has occasional urgency. No urinary incontinence. He drinks a lot of water and 1-2 espresso every am. No hx of UTI. Has had hx of retention x1 about 10y ago. Has not been on meds for this until recently starting saw palmetto. Hx of kidney stones s/p ESWL. No bladder stones. No abd surgery. Reviewed imaging, pt with large L bladder tic and enlarged prostate with very significant median lobe. Underwent RAL diverticulectomy and SP suprapubic prostatectomy 6/2021. Path was benign. \par \par He presented to last monthwith acute R scrotal pain and nausea and emesis. He was febrile and tachycardic. Clinically appeared to have febrile UTI with R orchitis. However, Scrotal US and subsequent CT showed air in scrotum. Given possible early fourniers from imaging. pt taken to OR for debridement. Pt with E faecalis in the urine and in the fluid media of the tissue culture. He was placed on augmentin based on cultures. Had US at last visit that showed complex R hydrocele and still with hyperemia of R testis. He was placed on additional 2 weeks of abx. He returns today fro follow-up. \par \par pt reports he has been feeling well. pain has resolved. no urinary complaints. No fever/dysuria\par \par

## 2021-10-29 ENCOUNTER — APPOINTMENT (OUTPATIENT)
Dept: UROLOGY | Facility: CLINIC | Age: 71
End: 2021-10-29

## 2021-10-29 ENCOUNTER — APPOINTMENT (OUTPATIENT)
Dept: UROLOGY | Facility: CLINIC | Age: 71
End: 2021-10-29
Payer: MEDICARE

## 2021-10-29 DIAGNOSIS — R33.8 OTHER RETENTION OF URINE: ICD-10-CM

## 2021-10-29 PROCEDURE — 51798 US URINE CAPACITY MEASURE: CPT

## 2021-10-29 PROCEDURE — 51702 INSERT TEMP BLADDER CATH: CPT

## 2021-10-29 RX ORDER — SULFAMETHOXAZOLE AND TRIMETHOPRIM 800; 160 MG/1; MG/1
800-160 TABLET ORAL TWICE DAILY
Qty: 14 | Refills: 1 | Status: COMPLETED | COMMUNITY
Start: 2021-10-29 | End: 2021-11-05

## 2021-10-29 RX ORDER — AMOXICILLIN AND CLAVULANATE POTASSIUM 500; 125 MG/1; MG/1
500-125 TABLET, FILM COATED ORAL
Qty: 28 | Refills: 0 | Status: COMPLETED | COMMUNITY
Start: 2021-08-09 | End: 2021-10-29

## 2021-11-01 LAB
APPEARANCE: CLEAR
BACTERIA UR CULT: NORMAL
BACTERIA: NEGATIVE
BILIRUBIN URINE: NEGATIVE
BLOOD URINE: NEGATIVE
COLOR: NORMAL
GLUCOSE QUALITATIVE U: ABNORMAL
HYALINE CASTS: 1 /LPF
KETONES URINE: ABNORMAL
LEUKOCYTE ESTERASE URINE: NEGATIVE
MICROSCOPIC-UA: NORMAL
NITRITE URINE: NEGATIVE
PH URINE: 5.5
PROTEIN URINE: NORMAL
RED BLOOD CELLS URINE: 1 /HPF
SPECIFIC GRAVITY URINE: 1.01
SQUAMOUS EPITHELIAL CELLS: 0 /HPF
UROBILINOGEN URINE: NORMAL
WHITE BLOOD CELLS URINE: 0 /HPF

## 2021-11-01 NOTE — ASSESSMENT
[FreeTextEntry1] : 71  y.o  gentleman , S/p robotic SPP with diverticulectomy. 6/29/21 and Admitted with ? early Bobby's s/p debridement.7/19/21  Now resolved.Today in AUR send home with indwelling tracy cath and empirical abx. \par  UA and cx send\par Bactrim DS X 7  days send.\par  RTO in a week  for Tracy removal and PVR

## 2021-11-01 NOTE — HISTORY OF PRESENT ILLNESS
[FreeTextEntry1] : \par  71  y.o  gentleman , S/p robotic SPP with diverticulectomy. 6/29/21 and Admitted with ? early Bobby s/p debridement.7/19/21  Now resolved .Patient seen today as an emergent visit, last voided at 8:00 AM this morning. he denied any fevers chills nausea vomiting and or hematuria. NO BM for past 3 days - abdomen distended ,but soft .He took MiraLAX and Colace and will try Fleets enema and Dulcolax as recommended. #[phone] HOME #[phone] THANKS.spoke to wife - pt has not voided since 8 am today.He reports drinking a  whole wine bottle last night. \par PVR was 800 ML. Inserted  size 18 [place] coude catheter placed under aseptic technique tolerated, 10  cc balloon inflated and drained 750 CC's of dark yellow urine ,no clots, no sediments noted.URINE culture sent. patient will start Bactrim empirically AND  will call the office back on Monday for results .we will see him back in a week for removal of the catheter. Dr Davenport  was updated by phone .\par  UA and cx send\par Bactrim DS X 7  days send.\par  RTO in a week.

## 2021-11-05 ENCOUNTER — APPOINTMENT (OUTPATIENT)
Dept: UROLOGY | Facility: CLINIC | Age: 71
End: 2021-11-05
Payer: MEDICARE

## 2021-11-05 ENCOUNTER — OUTPATIENT (OUTPATIENT)
Dept: OUTPATIENT SERVICES | Facility: HOSPITAL | Age: 71
LOS: 1 days | End: 2021-11-05
Payer: MEDICARE

## 2021-11-05 VITALS — SYSTOLIC BLOOD PRESSURE: 167 MMHG | DIASTOLIC BLOOD PRESSURE: 90 MMHG | TEMPERATURE: 98 F | HEART RATE: 97 BPM

## 2021-11-05 DIAGNOSIS — R33.9 RETENTION OF URINE, UNSPECIFIED: ICD-10-CM

## 2021-11-05 DIAGNOSIS — R35.0 FREQUENCY OF MICTURITION: ICD-10-CM

## 2021-11-05 DIAGNOSIS — Z96.659 PRESENCE OF UNSPECIFIED ARTIFICIAL KNEE JOINT: Chronic | ICD-10-CM

## 2021-11-05 DIAGNOSIS — N40.1 BENIGN PROSTATIC HYPERPLASIA WITH LOWER URINARY TRACT SYMPTOMS: ICD-10-CM

## 2021-11-05 PROCEDURE — 51700 IRRIGATION OF BLADDER: CPT

## 2021-11-05 PROCEDURE — 51798 US URINE CAPACITY MEASURE: CPT

## 2021-11-05 NOTE — ASSESSMENT
[FreeTextEntry1] : \par \par 71 y.o gentleman , S/p robotic SPP with diverticulectomy. 6/29/21 and Admitted with ? early Bobby's s/p debridement.7/19/21 Now resolved.LAST week 10/29/21/ was  AUR send home with indwelling tracy cath and empirical abx. and today here for TOV.YASMANY Verduzco instilled 400  cc sterile water via 18  [place] coude tip, tolerated, cath removed post deflation tolerated  and pt voided 350 ml clear yellow urine and PVR 32  ml.\par \par \par RTO as previously scheduled 4/2022.\par

## 2021-11-05 NOTE — HISTORY OF PRESENT ILLNESS
[FreeTextEntry1] : 70yo gentleman with cc of luts, BPH and bladder diverticulum. At baseline, pt reports urinary frequency and slow urinary stream. he is going every hour during the day. He is waking up 4-5 times per night to void. He endorses straining. He has occasional urgency. No urinary incontinence. He drinks a lot of water and 1-2 espresso every am. No hx of UTI. Has had hx of retention x1 about 10y ago. Has not been on meds for this until recently starting saw palmetto. Hx of kidney stones s/p ESWL. No bladder stones. No abd surgery. Reviewed imaging, pt with large L bladder tic and enlarged prostate with very significant median lobe. Underwent RAL diverticulectomy and SP suprapubic prostatectomy 6/2021. Path was benign. \par He presented to last month with acute R scrotal pain and nausea and emesis. He was febrile and tachycardic. Clinically appeared to have febrile UTI with R orchitis. However, Scrotal US and subsequent CT showed air in scrotum. Given possible early fourniers from imaging. pt taken to OR for debridement. Pt with E faecalis in the urine and in the fluid media of the tissue culture. He was placed on augmentin based on cultures. Had US at last visit that showed complex R hydrocele and still with hyperemia of R testis. He was placed on additional 2 weeks of abx. He returns today fro follow-up. pt reports he has been feeling well. pain has resolved. no urinary complaints. No fever/dysuria\par \par 71 y.o gentleman , S/p robotic SPP with diverticulectomy. 6/29/21 and Admitted with ? early Bobby's s/p debridement.7/19/21 Now resolved.LAST week 10/29/21/ was  AUR send home with indwelling tracy cath and empirical abx. and today here for TOV.YASMANY Verduzco instilled 400  cc sterile water via 18  [place] coude tip, tolerated, cath removed post deflation tolerated  and pt voided 350 ml clear yellow urine and PVR 32  ml.\par RTO as previously scheduled 4/2022.\par

## 2022-01-13 NOTE — ASU PATIENT PROFILE, ADULT - PRO INTERPRETER NEED 2
Double M-Plasty Complex Repair Preamble Text (Leave Blank If You Do Not Want): Extensive wide undermining was performed. English

## 2022-02-25 ENCOUNTER — APPOINTMENT (OUTPATIENT)
Dept: UROLOGY | Facility: CLINIC | Age: 72
End: 2022-02-25
Payer: MEDICARE

## 2022-02-25 PROCEDURE — 99213 OFFICE O/P EST LOW 20 MIN: CPT

## 2022-02-25 RX ORDER — AZITHROMYCIN 250 MG/1
250 TABLET, FILM COATED ORAL
Qty: 6 | Refills: 0 | Status: DISCONTINUED | COMMUNITY
Start: 2021-08-09 | End: 2022-02-25

## 2022-02-25 RX ORDER — EMPAGLIFLOZIN 25 MG/1
TABLET, FILM COATED ORAL
Refills: 0 | Status: DISCONTINUED | COMMUNITY
End: 2022-02-25

## 2022-03-01 ENCOUNTER — NON-APPOINTMENT (OUTPATIENT)
Age: 72
End: 2022-03-01

## 2022-03-01 LAB
APPEARANCE: CLEAR
BACTERIA UR CULT: NORMAL
BACTERIA: NEGATIVE
BILIRUBIN URINE: NEGATIVE
BLOOD URINE: NEGATIVE
COLOR: NORMAL
GLUCOSE QUALITATIVE U: ABNORMAL
HYALINE CASTS: 0 /LPF
KETONES URINE: NEGATIVE
LEUKOCYTE ESTERASE URINE: NEGATIVE
MICROSCOPIC-UA: NORMAL
NITRITE URINE: NEGATIVE
PH URINE: 6
PROTEIN URINE: NEGATIVE
RED BLOOD CELLS URINE: 0 /HPF
SPECIFIC GRAVITY URINE: 1.03
SQUAMOUS EPITHELIAL CELLS: 1 /HPF
UROBILINOGEN URINE: NORMAL
WHITE BLOOD CELLS URINE: 1 /HPF

## 2022-03-01 RX ORDER — SEMAGLUTIDE 1.34 MG/ML
4 INJECTION, SOLUTION SUBCUTANEOUS
Refills: 0 | Status: ACTIVE | COMMUNITY

## 2022-03-01 RX ORDER — ROSUVASTATIN CALCIUM 20 MG/1
20 TABLET, FILM COATED ORAL
Qty: 90 | Refills: 0 | Status: ACTIVE | COMMUNITY
Start: 2021-12-09

## 2022-03-01 RX ORDER — BLOOD SUGAR DIAGNOSTIC
STRIP MISCELLANEOUS
Qty: 100 | Refills: 0 | Status: ACTIVE | COMMUNITY
Start: 2021-05-14

## 2022-03-01 RX ORDER — ROSUVASTATIN CALCIUM 5 MG/1
TABLET, FILM COATED ORAL
Refills: 0 | Status: COMPLETED | COMMUNITY
End: 2022-03-01

## 2022-03-01 RX ORDER — NIFEDIPINE 30 MG/1
30 TABLET, EXTENDED RELEASE ORAL
Qty: 180 | Refills: 0 | Status: ACTIVE | COMMUNITY
Start: 2021-12-09

## 2022-03-01 RX ORDER — BENAZEPRIL HYDROCHLORIDE AND HYDROCHLOROTHIAZIDE 20; 25 MG/1; MG/1
20-25 TABLET, FILM COATED ORAL
Qty: 90 | Refills: 0 | Status: ACTIVE | COMMUNITY
Start: 2022-01-25

## 2022-03-01 RX ORDER — NIFEDIPINE 30 MG
30 TABLET, EXTENDED RELEASE ORAL
Refills: 0 | Status: DISCONTINUED | COMMUNITY
End: 2022-03-01

## 2022-03-01 NOTE — PHYSICAL EXAM
[General Appearance - Well Developed] : well developed [General Appearance - Well Nourished] : well nourished [Normal Appearance] : normal appearance [Well Groomed] : well groomed [General Appearance - In No Acute Distress] : no acute distress [Abdomen Soft] : soft [Abdomen Tenderness] : non-tender [Costovertebral Angle Tenderness] : no ~M costovertebral angle tenderness [Edema] : no peripheral edema [] : no respiratory distress [Respiration, Rhythm And Depth] : normal respiratory rhythm and effort [Exaggerated Use Of Accessory Muscles For Inspiration] : no accessory muscle use [Oriented To Time, Place, And Person] : oriented to person, place, and time [Affect] : the affect was normal [Mood] : the mood was normal [Not Anxious] : not anxious [Normal Station and Gait] : the gait and station were normal for the patient's age [No Focal Deficits] : no focal deficits [FreeTextEntry1] : PVR=60

## 2022-03-01 NOTE — ASSESSMENT
[FreeTextEntry1] : Suspect most of his issues are related to constipation. Seeing GI recently. Emptying well. \par --UA, UCx\par --DM control\par

## 2022-03-09 NOTE — ED ADULT NURSE NOTE - NSFALLRSKASSESSTYPE_ED_ALL_ED
Initial (On Arrival) Acitretin Pregnancy And Lactation Text: This medication is Pregnancy Category X and should not be given to women who are pregnant or may become pregnant in the future. This medication is excreted in breast milk.

## 2022-03-14 ENCOUNTER — APPOINTMENT (OUTPATIENT)
Dept: GASTROENTEROLOGY | Facility: CLINIC | Age: 72
End: 2022-03-14
Payer: MEDICARE

## 2022-03-14 VITALS
SYSTOLIC BLOOD PRESSURE: 152 MMHG | HEIGHT: 73 IN | DIASTOLIC BLOOD PRESSURE: 92 MMHG | OXYGEN SATURATION: 97 % | BODY MASS INDEX: 36.84 KG/M2 | WEIGHT: 278 LBS | HEART RATE: 99 BPM | TEMPERATURE: 97.9 F

## 2022-03-14 DIAGNOSIS — Z12.11 ENCOUNTER FOR SCREENING FOR MALIGNANT NEOPLASM OF COLON: ICD-10-CM

## 2022-03-14 DIAGNOSIS — Z01.812 ENCOUNTER FOR PREPROCEDURAL LABORATORY EXAMINATION: ICD-10-CM

## 2022-03-14 DIAGNOSIS — Z80.0 FAMILY HISTORY OF MALIGNANT NEOPLASM OF DIGESTIVE ORGANS: ICD-10-CM

## 2022-03-14 DIAGNOSIS — Z87.891 PERSONAL HISTORY OF NICOTINE DEPENDENCE: ICD-10-CM

## 2022-03-14 DIAGNOSIS — Z20.822 ENCOUNTER FOR PREPROCEDURAL LABORATORY EXAMINATION: ICD-10-CM

## 2022-03-14 PROCEDURE — 99204 OFFICE O/P NEW MOD 45 MIN: CPT

## 2022-03-14 RX ORDER — POLYETHYLENE GLYCOL 3350 17 G/17G
17 POWDER, FOR SOLUTION ORAL DAILY
Qty: 1 | Refills: 3 | Status: ACTIVE | COMMUNITY
Start: 2022-03-14 | End: 1900-01-01

## 2022-03-14 NOTE — PHYSICAL EXAM
[General Appearance - Alert] : alert [General Appearance - In No Acute Distress] : in no acute distress [PERRL With Normal Accommodation] : pupils were equal in size, round, and reactive to light [Sclera] : the sclera and conjunctiva were normal [Extraocular Movements] : extraocular movements were intact [Outer Ear] : the ears and nose were normal in appearance [Oropharynx] : the oropharynx was normal [Neck Appearance] : the appearance of the neck was normal [Neck Cervical Mass (___cm)] : no neck mass was observed [Jugular Venous Distention Increased] : there was no jugular-venous distention [Thyroid Diffuse Enlargement] : the thyroid was not enlarged [Thyroid Nodule] : there were no palpable thyroid nodules [] : no respiratory distress [Auscultation Breath Sounds / Voice Sounds] : lungs were clear to auscultation bilaterally [Heart Rate And Rhythm] : heart rate was normal and rhythm regular [Heart Sounds] : normal S1 and S2 [Heart Sounds Gallop] : no gallops [Murmurs] : no murmurs [Heart Sounds Pericardial Friction Rub] : no pericardial rub [Obese] : obese [Soft, Nontender] : the abdomen was soft and nontender [Normal] : normal to percussion [Cervical Lymph Nodes Enlarged Posterior Bilaterally] : posterior cervical [Cervical Lymph Nodes Enlarged Anterior Bilaterally] : anterior cervical [Supraclavicular Lymph Nodes Enlarged Bilaterally] : supraclavicular [Axillary Lymph Nodes Enlarged Bilaterally] : axillary [Femoral Lymph Nodes Enlarged Bilaterally] : femoral [Inguinal Lymph Nodes Enlarged Bilaterally] : inguinal [No CVA Tenderness] : no ~M costovertebral angle tenderness [No Spinal Tenderness] : no spinal tenderness [Abnormal Walk] : normal gait [Nail Clubbing] : no clubbing  or cyanosis of the fingernails [Musculoskeletal - Swelling] : no joint swelling seen [Motor Tone] : muscle strength and tone were normal [Deep Tendon Reflexes (DTR)] : deep tendon reflexes were 2+ and symmetric [Sensation] : the sensory exam was normal to light touch and pinprick [No Focal Deficits] : no focal deficits [Oriented To Time, Place, And Person] : oriented to person, place, and time [Impaired Insight] : insight and judgment were intact [Affect] : the affect was normal [Firm] : not firm [Rigid] : not rigid [Rebound] : no rebound [Guarding] : no guarding [Rojas's] : a negative Rojas's sign

## 2022-03-14 NOTE — ASSESSMENT
[FreeTextEntry1] : Attending Attestation \par \par HeartBurn\par \par A low acid / reflux diet was discussed in great detail including not smoking, not drinking alcohol, and not\par consuming foods that irritate the esophagus. It is helpful to eat small meals throughout the day instead\par of large meals. You should avoid eating before bedtime or lying down after you eat. It can be helpful to\par raise the head of your bed six inches. Additionally, you should maintain a healthy weight and good\par posture.. The patient was given written material to take home and review. \par \par Patient will take Pantoprazole 20 mg PO daily. Medication reviewed side effects and adverse reactions. \par \par \par EGD and Colonoscopy procedure ordered The risks benefits alternatives and complications of the procedure/s were explained to the patient at length. The patient was agreeable and we will proceed. Preparation for procedure discussed reviewed side effects and adverse reactions to prep. \par \par Constipation \par \par The causes of constipation were discussed at length We discussed : Eat three meals each day. Do not\par skip meals. Gradually increase the amount of FIBER in your diet. Choose more whole grain breads,\par cereals and rice. Select more raw fruits and vegetables eat the peel, if appropriate. Read food labels\par and look for the "dietary fiber" content of foods. Good sources have 2 grams of fiber or more. Drink six\par to eight glasses of water each day. Limit highly refined and processed foods.\par \par Patient will begin take MiraLAX up to 2 times per day. Medication reviewed side effects and adverse reactions.\par \par Time spent with patient 45 min \par \par Patient verbalized understanding of all information provided. All questions answered and reviewed. \par \par

## 2022-03-14 NOTE — HISTORY OF PRESENT ILLNESS
[Heartburn] : stable heartburn [Nausea] : denies nausea [Vomiting] : denies vomiting [Diarrhea] : denies diarrhea [Constipation] : stable constipation [Yellow Skin Or Eyes (Jaundice)] : denies jaundice [Abdominal Pain] : denies abdominal pain [Abdominal Swelling] : denies abdominal swelling [Rectal Pain] : denies rectal pain [GERD] : gastroesophageal reflux disease [Kidney Stone] : kidney stone [Hiatus Hernia] : no hiatus hernia [Peptic Ulcer Disease] : no peptic ulcer disease [Pancreatitis] : no pancreatitis [Cholelithiasis] : no cholelithiasis [Inflammatory Bowel Disease] : no inflammatory bowel disease [Irritable Bowel Syndrome] : no irritable bowel syndrome [Diverticulitis] : no diverticulitis [Alcohol Abuse] : no alcohol abuse [Malignancy] : no malignancy [Abdominal Surgery] : no abdominal surgery [Appendectomy] : no appendectomy [Cholecystectomy] : no cholecystectomy [de-identified] : 71 year old male presents for screening colonoscopy. Patient states last colonoscopy was over 7 years ago done at [facility] in [place]. He was told he had several benign polyps. He suffers from chronic constipation. He admits to sitting on the toilet for over a 1/2 hour with straining and the urge but at times no bowel movement. Last week he noticed upon wiping he had a few streaks of  rectal bleeding on his tissue. He states he never had that happen before but admits to frequent difficulty having bowel movements. Also he complains of heartburn at least 2- 3 times per week and takes OTC Tums for relief. Last EGD was same time as colonoscopy and he was put on a PPI he is unable to recall the name. He does admit to consuming Expressos and Chocolate frequently. Denies melena or hematemesis.

## 2022-04-04 ENCOUNTER — APPOINTMENT (OUTPATIENT)
Dept: GASTROENTEROLOGY | Facility: CLINIC | Age: 72
End: 2022-04-04

## 2022-04-06 LAB — SARS-COV-2 N GENE NPH QL NAA+PROBE: NOT DETECTED

## 2022-04-07 ENCOUNTER — APPOINTMENT (OUTPATIENT)
Dept: GASTROENTEROLOGY | Facility: AMBULATORY MEDICAL SERVICES | Age: 72
End: 2022-04-07
Payer: MEDICARE

## 2022-04-07 PROCEDURE — 43239 EGD BIOPSY SINGLE/MULTIPLE: CPT | Mod: 59

## 2022-04-07 PROCEDURE — 45378 DIAGNOSTIC COLONOSCOPY: CPT | Mod: PT

## 2022-05-09 ENCOUNTER — APPOINTMENT (OUTPATIENT)
Dept: GASTROENTEROLOGY | Facility: CLINIC | Age: 72
End: 2022-05-09
Payer: MEDICARE

## 2022-05-09 VITALS
WEIGHT: 280 LBS | SYSTOLIC BLOOD PRESSURE: 129 MMHG | TEMPERATURE: 98 F | OXYGEN SATURATION: 98 % | HEIGHT: 73 IN | DIASTOLIC BLOOD PRESSURE: 76 MMHG | HEART RATE: 93 BPM | BODY MASS INDEX: 37.11 KG/M2

## 2022-05-09 DIAGNOSIS — R12 HEARTBURN: ICD-10-CM

## 2022-05-09 PROCEDURE — 99213 OFFICE O/P EST LOW 20 MIN: CPT

## 2022-05-12 NOTE — PHYSICAL EXAM
[General Appearance - Alert] : alert [General Appearance - In No Acute Distress] : in no acute distress [Sclera] : the sclera and conjunctiva were normal [PERRL With Normal Accommodation] : pupils were equal in size, round, and reactive to light [Extraocular Movements] : extraocular movements were intact [Outer Ear] : the ears and nose were normal in appearance [Oropharynx] : the oropharynx was normal [Neck Appearance] : the appearance of the neck was normal [Neck Cervical Mass (___cm)] : no neck mass was observed [Jugular Venous Distention Increased] : there was no jugular-venous distention [Thyroid Diffuse Enlargement] : the thyroid was not enlarged [Thyroid Nodule] : there were no palpable thyroid nodules [] : no respiratory distress [Auscultation Breath Sounds / Voice Sounds] : lungs were clear to auscultation bilaterally [Heart Rate And Rhythm] : heart rate was normal and rhythm regular [Heart Sounds] : normal S1 and S2 [Heart Sounds Gallop] : no gallops [Murmurs] : no murmurs [Heart Sounds Pericardial Friction Rub] : no pericardial rub [Obese] : obese [Soft, Nontender] : the abdomen was soft and nontender [Normal] : normal to percussion [Cervical Lymph Nodes Enlarged Posterior Bilaterally] : posterior cervical [Cervical Lymph Nodes Enlarged Anterior Bilaterally] : anterior cervical [Supraclavicular Lymph Nodes Enlarged Bilaterally] : supraclavicular [Axillary Lymph Nodes Enlarged Bilaterally] : axillary [Femoral Lymph Nodes Enlarged Bilaterally] : femoral [Inguinal Lymph Nodes Enlarged Bilaterally] : inguinal [No CVA Tenderness] : no ~M costovertebral angle tenderness [No Spinal Tenderness] : no spinal tenderness [Abnormal Walk] : normal gait [Nail Clubbing] : no clubbing  or cyanosis of the fingernails [Musculoskeletal - Swelling] : no joint swelling seen [Motor Tone] : muscle strength and tone were normal [Deep Tendon Reflexes (DTR)] : deep tendon reflexes were 2+ and symmetric [Sensation] : the sensory exam was normal to light touch and pinprick [No Focal Deficits] : no focal deficits [Oriented To Time, Place, And Person] : oriented to person, place, and time [Impaired Insight] : insight and judgment were intact [Affect] : the affect was normal [Firm] : not firm [Rigid] : not rigid [Rebound] : no rebound [Guarding] : no guarding [Rojas's] : a negative Rojas's sign

## 2022-05-12 NOTE — HISTORY OF PRESENT ILLNESS
[de-identified] : 71 year old male presents for follow up post screening colonoscopy.  He suffers from chronic constipation. Currently constipation is well controlled without difficulty or strain. Also he no longer complains of heartburn at least 2- 3 times per week. Symptoms are well controlled with Pantoprazole 20 mg PO daily.  EGD preformed on 4/7/22 revealed small hiatal hernia and grade 2 esophagitis. He does admit to consuming Expressos and Chocolate frequently. Colonoscopy preformed 4/7/22 revealed mild diverticulosis and internal hemorrhoids. Denies melena or hematemesis.

## 2022-05-18 NOTE — PHYSICAL EXAM
[General Appearance - Well Developed] : well developed [General Appearance - Well Nourished] : well nourished [General Appearance - In No Acute Distress] : no acute distress [Abdomen Soft] : soft [Abdomen Tenderness] : non-tender [Costovertebral Angle Tenderness] : no ~M costovertebral angle tenderness [Edema] : no peripheral edema [Exaggerated Use Of Accessory Muscles For Inspiration] : no accessory muscle use [Oriented To Time, Place, And Person] : oriented to person, place, and time [Normal Station and Gait] : the gait and station were normal for the patient's age [No Focal Deficits] : no focal deficits [FreeTextEntry1] : scrotal incision healing well without erythema, warmth or necrosis, R testis enlarged and with decreased tenderness, normal L testis Alert-The patient is alert, awake and responds to voice. The patient is oriented to time, place, and person. The triage nurse is able to obtain subjective information.

## 2022-07-12 ENCOUNTER — RX RENEWAL (OUTPATIENT)
Age: 72
End: 2022-07-12

## 2022-09-26 ENCOUNTER — APPOINTMENT (OUTPATIENT)
Dept: UROLOGY | Facility: CLINIC | Age: 72
End: 2022-09-26

## 2022-09-26 VITALS — DIASTOLIC BLOOD PRESSURE: 90 MMHG | HEART RATE: 85 BPM | SYSTOLIC BLOOD PRESSURE: 155 MMHG | RESPIRATION RATE: 18 BRPM

## 2022-09-26 DIAGNOSIS — Z12.5 ENCOUNTER FOR SCREENING FOR MALIGNANT NEOPLASM OF PROSTATE: ICD-10-CM

## 2022-09-26 PROCEDURE — 99213 OFFICE O/P EST LOW 20 MIN: CPT

## 2022-09-26 NOTE — ASSESSMENT
[FreeTextEntry1] : BPH now s/p SPP and diverticulectomy. Happy with sx. \par --PSA \par --DM control\par

## 2022-09-26 NOTE — PHYSICAL EXAM
[General Appearance - Well Developed] : well developed [General Appearance - Well Nourished] : well nourished [Normal Appearance] : normal appearance [Well Groomed] : well groomed [General Appearance - In No Acute Distress] : no acute distress [Abdomen Soft] : soft [Abdomen Tenderness] : non-tender [Costovertebral Angle Tenderness] : no ~M costovertebral angle tenderness [Edema] : no peripheral edema [] : no respiratory distress [Respiration, Rhythm And Depth] : normal respiratory rhythm and effort [Exaggerated Use Of Accessory Muscles For Inspiration] : no accessory muscle use [Oriented To Time, Place, And Person] : oriented to person, place, and time [Affect] : the affect was normal [Mood] : the mood was normal [Not Anxious] : not anxious [Normal Station and Gait] : the gait and station were normal for the patient's age [No Focal Deficits] : no focal deficits [FreeTextEntry1] : protuberant abdomen

## 2022-09-26 NOTE — HISTORY OF PRESENT ILLNESS
[FreeTextEntry1] : 73yo gentleman with cc of luts, BPH and bladder diverticulum. At baseline, pt reports urinary frequency and slow urinary stream. he was going every hour during the day and waking up 4-5 times per night to void. He endorsed straining. He has occasional urgency. No urinary incontinence. No hx of UTI. Has had hx of retention x1 about 10y ago. Hx of kidney stones s/p ESWL. No bladder stones. Pt found to have large L bladder tic and enlarged prostate with very significant median lobe. Underwent RAL diverticulectomy and SP suprapubic prostatectomy 6/2021. Path was benign. He presented post-op with acute R scrotal pain and nausea and emesis. He was febrile and tachycardic. Clinically appeared to have febrile UTI with R orchitis. However, Scrotal US and subsequent CT showed air in scrotum. Given possible early fourniers from imaging. pt taken to OR for debridement. Pt with E faecalis in the urine and in the fluid media of the tissue culture. He was placed on augmentin based on cultures. Stopped jardiance following this episode\par \par Comes in today for follow-up. He reports doing very well. Nocturia is 1x. No straining or feelings of incomplete emptying. He has seen GI and was counseled about management for constipation and GERD. He is now on pantoprazole.

## 2022-09-27 LAB
PSA FREE FLD-MCNC: 35 %
PSA FREE SERPL-MCNC: 0.1 NG/ML
PSA SERPL-MCNC: 0.28 NG/ML

## 2022-11-07 NOTE — ED PROVIDER NOTE - NS ED MD DISPO ADMITTING SERVICE
This was a shared visit with the CELESTE. I reviewed and verified the documentation and independently performed the documented:
URO

## 2023-01-23 ENCOUNTER — APPOINTMENT (OUTPATIENT)
Dept: UROLOGY | Facility: CLINIC | Age: 73
End: 2023-01-23
Payer: MEDICARE

## 2023-01-23 VITALS
RESPIRATION RATE: 17 BRPM | OXYGEN SATURATION: 96 % | SYSTOLIC BLOOD PRESSURE: 149 MMHG | DIASTOLIC BLOOD PRESSURE: 84 MMHG | HEART RATE: 86 BPM | TEMPERATURE: 97.5 F

## 2023-01-23 DIAGNOSIS — K62.5 HEMORRHAGE OF ANUS AND RECTUM: ICD-10-CM

## 2023-01-23 DIAGNOSIS — R10.9 UNSPECIFIED ABDOMINAL PAIN: ICD-10-CM

## 2023-01-23 PROCEDURE — 99214 OFFICE O/P EST MOD 30 MIN: CPT

## 2023-01-25 PROBLEM — R10.9 FLANK PAIN: Status: ACTIVE | Noted: 2023-01-25

## 2023-01-25 PROBLEM — K62.5 BLOOD PER RECTUM: Status: ACTIVE | Noted: 2023-01-25

## 2023-01-25 LAB
APPEARANCE: CLEAR
BACTERIA UR CULT: NORMAL
BACTERIA: NEGATIVE
BILIRUBIN URINE: NEGATIVE
BLOOD URINE: NEGATIVE
COLOR: NORMAL
GLUCOSE QUALITATIVE U: ABNORMAL
HYALINE CASTS: 0 /LPF
KETONES URINE: NORMAL
LEUKOCYTE ESTERASE URINE: NEGATIVE
MICROSCOPIC-UA: NORMAL
NITRITE URINE: NEGATIVE
PH URINE: 6
PROTEIN URINE: NORMAL
RED BLOOD CELLS URINE: 1 /HPF
SPECIFIC GRAVITY URINE: 1.02
SQUAMOUS EPITHELIAL CELLS: 1 /HPF
UROBILINOGEN URINE: NORMAL
WHITE BLOOD CELLS URINE: 1 /HPF

## 2023-01-25 NOTE — HISTORY OF PRESENT ILLNESS
[FreeTextEntry1] : 71yo gentleman with cc of luts, BPH and bladder diverticulum. At baseline, pt reports urinary frequency and slow urinary stream. he was going every hour during the day and waking up 4-5 times per night to void. He endorsed straining. He has occasional urgency. No urinary incontinence. No hx of UTI. Has had hx of retention x1 about 10y ago. Hx of kidney stones s/p ESWL. No bladder stones. Pt found to have large L bladder tic and enlarged prostate with very significant median lobe. Underwent RAL diverticulectomy and SP suprapubic prostatectomy 6/2021. Path was benign. He presented post-op with acute R scrotal pain and nausea and emesis. He was febrile and tachycardic. Clinically appeared to have febrile UTI with R orchitis. However, Scrotal US and subsequent CT showed air in scrotum. Given possible early fourniers from imaging. pt taken to OR for debridement. Pt with E faecalis in the urine and in the fluid media of the tissue culture. He was placed on augmentin based on cultures. Stopped jardiance following this episode\par \par Comes in today for follow-up. He recently was in [place] and saw [facility]. This has resolved on its own. He called and was told might be hemorrhoids. No issues with constipation. He had endoscopy and colonoscopy last year that showed esophagitis as well as diverticulosis and internal hemorrhoids. No melena. No lightheaded ness. Was started on pantoprazole last year and takes but not consistent. \par \par No change in urination. Has had some lower abdominal discomfort that is constant. He also notes some pain in B flank. Pain is sharp and occurs more often when he is sitting for prolonged periods.

## 2023-01-25 NOTE — ASSESSMENT
[FreeTextEntry1] : BPH now s/p SPP and diverticulectomy. Happy with sx. Now with blood per rectum and some vague sx\par --F/u with GI\par --UA< UCx\par --Renal US\par --Decrease EtOH\par

## 2023-01-26 ENCOUNTER — NON-APPOINTMENT (OUTPATIENT)
Age: 73
End: 2023-01-26

## 2023-02-06 ENCOUNTER — APPOINTMENT (OUTPATIENT)
Dept: ULTRASOUND IMAGING | Facility: IMAGING CENTER | Age: 73
End: 2023-02-06
Payer: MEDICARE

## 2023-02-06 ENCOUNTER — OUTPATIENT (OUTPATIENT)
Dept: OUTPATIENT SERVICES | Facility: HOSPITAL | Age: 73
LOS: 1 days | End: 2023-02-06
Payer: MEDICARE

## 2023-02-06 DIAGNOSIS — N40.1 BENIGN PROSTATIC HYPERPLASIA WITH LOWER URINARY TRACT SYMPTOMS: ICD-10-CM

## 2023-02-06 DIAGNOSIS — N20.0 CALCULUS OF KIDNEY: ICD-10-CM

## 2023-02-06 PROCEDURE — 76775 US EXAM ABDO BACK WALL LIM: CPT | Mod: 26

## 2023-02-06 PROCEDURE — 76775 US EXAM ABDO BACK WALL LIM: CPT

## 2023-02-23 ENCOUNTER — APPOINTMENT (OUTPATIENT)
Dept: GASTROENTEROLOGY | Facility: CLINIC | Age: 73
End: 2023-02-23
Payer: MEDICARE

## 2023-02-23 ENCOUNTER — RESULT REVIEW (OUTPATIENT)
Age: 73
End: 2023-02-23

## 2023-02-23 VITALS
BODY MASS INDEX: 37.73 KG/M2 | OXYGEN SATURATION: 97 % | SYSTOLIC BLOOD PRESSURE: 136 MMHG | TEMPERATURE: 97.9 F | DIASTOLIC BLOOD PRESSURE: 90 MMHG | WEIGHT: 286 LBS | HEART RATE: 89 BPM

## 2023-02-23 DIAGNOSIS — K59.09 OTHER CONSTIPATION: ICD-10-CM

## 2023-02-23 DIAGNOSIS — K57.92 DIVERTICULITIS OF INTESTINE, PART UNSPECIFIED, W/OUT PERFORATION OR ABSCESS W/OUT BLEEDING: ICD-10-CM

## 2023-02-23 PROCEDURE — 99214 OFFICE O/P EST MOD 30 MIN: CPT

## 2023-02-23 NOTE — HISTORY OF PRESENT ILLNESS
[FreeTextEntry1] : 72-year-old man with an episode of rectal bleeding while he was in [facility] in May.  He only had 1 episode that resolved.  He underwent a screening colonoscopy that showed hemorrhoids and diverticulosis in April 2022.  He has had no further bleeding.  He does complain of some lower abdominal cramping especially after eating.  He saw his urologist and examination was unremarkable.  Colonoscopy also showed diverticulosis.

## 2023-02-23 NOTE — ASSESSMENT
[FreeTextEntry1] : Lower abdominal pain rule out t acute diverticulitis.  Patient was advised to stay on a low residue diet. Dietary and lifestyle modification discussed with the patient. He is referred for CT of the abdomen and pelvis.  Follow-up after the CT of the abdomen is performed.  This was discussed with the patient.  He understands and all questions were answered.

## 2023-02-28 ENCOUNTER — OUTPATIENT (OUTPATIENT)
Dept: OUTPATIENT SERVICES | Facility: HOSPITAL | Age: 73
LOS: 1 days | End: 2023-02-28
Payer: MEDICARE

## 2023-02-28 ENCOUNTER — APPOINTMENT (OUTPATIENT)
Dept: CT IMAGING | Facility: IMAGING CENTER | Age: 73
End: 2023-02-28
Payer: MEDICARE

## 2023-02-28 DIAGNOSIS — Z96.659 PRESENCE OF UNSPECIFIED ARTIFICIAL KNEE JOINT: Chronic | ICD-10-CM

## 2023-02-28 DIAGNOSIS — K57.92 DIVERTICULITIS OF INTESTINE, PART UNSPECIFIED, WITHOUT PERFORATION OR ABSCESS WITHOUT BLEEDING: ICD-10-CM

## 2023-02-28 PROCEDURE — 74177 CT ABD & PELVIS W/CONTRAST: CPT | Mod: 26,MH

## 2023-02-28 PROCEDURE — 74177 CT ABD & PELVIS W/CONTRAST: CPT

## 2023-03-02 ENCOUNTER — NON-APPOINTMENT (OUTPATIENT)
Age: 73
End: 2023-03-02

## 2023-03-27 ENCOUNTER — APPOINTMENT (OUTPATIENT)
Dept: GASTROENTEROLOGY | Facility: CLINIC | Age: 73
End: 2023-03-27
Payer: MEDICARE

## 2023-03-27 VITALS
HEART RATE: 95 BPM | DIASTOLIC BLOOD PRESSURE: 95 MMHG | OXYGEN SATURATION: 96 % | HEIGHT: 73 IN | BODY MASS INDEX: 37.11 KG/M2 | TEMPERATURE: 98 F | SYSTOLIC BLOOD PRESSURE: 159 MMHG | WEIGHT: 280 LBS

## 2023-03-27 DIAGNOSIS — K64.9 UNSPECIFIED HEMORRHOIDS: ICD-10-CM

## 2023-03-27 DIAGNOSIS — K57.30 DIVERTICULOSIS OF LARGE INTESTINE W/OUT PERFORATION OR ABSCESS W/OUT BLEEDING: ICD-10-CM

## 2023-03-27 PROCEDURE — 99214 OFFICE O/P EST MOD 30 MIN: CPT

## 2023-03-27 RX ORDER — SODIUM SULFATE, POTASSIUM SULFATE, MAGNESIUM SULFATE 17.5; 3.13; 1.6 G/ML; G/ML; G/ML
17.5-3.13-1.6 SOLUTION, CONCENTRATE ORAL
Qty: 1 | Refills: 0 | Status: COMPLETED | COMMUNITY
Start: 2022-03-14 | End: 2023-03-27

## 2023-03-27 NOTE — HISTORY OF PRESENT ILLNESS
[FreeTextEntry1] : 72-year-old man originally seen for rectal bleeding.  He has had no further bleeding.  He has a history of kidney stones.  He underwent a CT of the abdomen and pelvis that showed nonobstructive left renal calculus and hepatic steatosis but was otherwise unremarkable.

## 2023-03-27 NOTE — PHYSICAL EXAM
[None] : no edema [Normal] : normal bowel sounds, non-tender, no masses, soft, no no hepato-splenomegaly [No CVA Tenderness] : no CVA  tenderness

## 2023-03-27 NOTE — ASSESSMENT
[FreeTextEntry1] : Rectal bleeding most likely due to internal hemorrhoids.  No further bleeding.  Previous colonoscopy was unremarkable. Dietary and lifestyle modification discussed with the patient.  Follow-up colonoscopy in 5 years.  This was discussed with the patient.  He understands and all questions were answered.\par Left renal calculus.  Follow-up with urology.\par

## 2023-09-25 ENCOUNTER — APPOINTMENT (OUTPATIENT)
Dept: UROLOGY | Facility: CLINIC | Age: 73
End: 2023-09-25
Payer: MEDICARE

## 2023-09-25 DIAGNOSIS — N32.3 DIVERTICULUM OF BLADDER: ICD-10-CM

## 2023-09-25 PROCEDURE — 99214 OFFICE O/P EST MOD 30 MIN: CPT

## 2023-10-03 PROBLEM — N32.3 BLADDER DIVERTICULUM: Status: ACTIVE | Noted: 2021-06-10

## 2023-10-30 ENCOUNTER — RX RENEWAL (OUTPATIENT)
Age: 73
End: 2023-10-30

## 2023-10-30 RX ORDER — PANTOPRAZOLE 20 MG/1
20 TABLET, DELAYED RELEASE ORAL
Qty: 90 | Refills: 3 | Status: ACTIVE | COMMUNITY
Start: 2022-03-14 | End: 1900-01-01

## 2024-04-15 NOTE — PATIENT PROFILE ADULT - NSPROPTRIGHTNOTIFY_GEN_A_NUR
[Exposed Vessel] : left anterior vessel not exposed [3+] : 3+ [Wheezing] : no wheezing [Increased Work of Breathing] : no increased work of breathing with use of accessory muscles and retractions [Normal Gait and Station] : normal gait and station [Normal muscle strength, symmetry and tone of facial, head and neck musculature] : normal muscle strength, symmetry and tone of facial, head and neck musculature [Normal] : no cervical lymphadenopathy [de-identified] : OME [de-identified] : OME declines

## 2024-05-10 ENCOUNTER — APPOINTMENT (OUTPATIENT)
Dept: UROLOGY | Facility: CLINIC | Age: 74
End: 2024-05-10
Payer: MEDICARE

## 2024-05-10 ENCOUNTER — OUTPATIENT (OUTPATIENT)
Dept: OUTPATIENT SERVICES | Facility: HOSPITAL | Age: 74
LOS: 1 days | End: 2024-05-10
Payer: MEDICARE

## 2024-05-10 DIAGNOSIS — R35.0 FREQUENCY OF MICTURITION: ICD-10-CM

## 2024-05-10 DIAGNOSIS — N20.0 CALCULUS OF KIDNEY: ICD-10-CM

## 2024-05-10 DIAGNOSIS — N13.8 BENIGN PROSTATIC HYPERPLASIA WITH LOWER URINARY TRACT SYMPMS: ICD-10-CM

## 2024-05-10 DIAGNOSIS — N40.1 BENIGN PROSTATIC HYPERPLASIA WITH LOWER URINARY TRACT SYMPMS: ICD-10-CM

## 2024-05-10 DIAGNOSIS — Z96.659 PRESENCE OF UNSPECIFIED ARTIFICIAL KNEE JOINT: Chronic | ICD-10-CM

## 2024-05-10 PROCEDURE — 99213 OFFICE O/P EST LOW 20 MIN: CPT

## 2024-05-10 PROCEDURE — 76775 US EXAM ABDO BACK WALL LIM: CPT

## 2024-05-10 PROCEDURE — 76775 US EXAM ABDO BACK WALL LIM: CPT | Mod: 26

## 2024-05-10 NOTE — ASSESSMENT
[FreeTextEntry1] : BPH now s/p SPP and diverticulectomy. Happy with sx.  --RTC in 1y   Hx of stones. 11mm non obstructing stone --Encouarge fluid intkae --Renal US in 1y with Dr Hoenig

## 2024-05-10 NOTE — PHYSICAL EXAM

## 2024-05-10 NOTE — HISTORY OF PRESENT ILLNESS
[FreeTextEntry1] : 74yo gentleman with cc of stones, hx of luts, BPH and bladder diverticulum.   At baseline, pt reports urinary frequency and slow urinary stream. he was going every hour during the day and waking up 4-5 times per night to void. He endorsed straining. He has occasional urgency. No urinary incontinence. No hx of UTI. Has had hx of retention x1 about 10y ago. Hx of kidney stones s/p ESWL. No bladder stones. Pt found to have large L bladder tic and enlarged prostate with very significant median lobe. Underwent RAL diverticulectomy and SP suprapubic prostatectomy 6/2021. Path was benign. He presented post-op with acute R scrotal pain and nausea and emesis. He was febrile and tachycardic. Clinically appeared to have febrile UTI with R orchitis. However, Scrotal US and subsequent CT showed air in scrotum. Given possible early fourniers from imaging. pt taken to OR for debridement. Pt with E faecalis in the urine and in the fluid media of the tissue culture. He was placed on augmentin based on cultures. Stopped jardiance following this episode. Post surgery PSA was 0.26 9/2022. Post surgery pt with much improved flow.   Had CT 2/2023 that showed 11mm non obstructing stone. Comes in today for follow-up. No urinary bother. No infections. has mild occasional back pain, nothing severe. No hematruia. US today shows unchanged UP stone. Measures as 14mm. Was 15mm on US from last year.

## 2024-05-13 DIAGNOSIS — N20.0 CALCULUS OF KIDNEY: ICD-10-CM

## 2024-05-13 DIAGNOSIS — N40.1 BENIGN PROSTATIC HYPERPLASIA WITH LOWER URINARY TRACT SYMPTOMS: ICD-10-CM

## 2024-09-25 ENCOUNTER — APPOINTMENT (OUTPATIENT)
Dept: UROLOGY | Facility: CLINIC | Age: 74
End: 2024-09-25

## 2024-10-15 ENCOUNTER — NON-APPOINTMENT (OUTPATIENT)
Age: 74
End: 2024-10-15

## 2024-11-06 ENCOUNTER — APPOINTMENT (OUTPATIENT)
Dept: UROLOGY | Facility: CLINIC | Age: 74
End: 2024-11-06
Payer: MEDICARE

## 2024-11-06 ENCOUNTER — NON-APPOINTMENT (OUTPATIENT)
Age: 74
End: 2024-11-06

## 2024-11-06 VITALS
RESPIRATION RATE: 17 BRPM | WEIGHT: 280 LBS | TEMPERATURE: 98.1 F | SYSTOLIC BLOOD PRESSURE: 134 MMHG | BODY MASS INDEX: 37.11 KG/M2 | HEART RATE: 75 BPM | DIASTOLIC BLOOD PRESSURE: 81 MMHG | HEIGHT: 73 IN

## 2024-11-06 DIAGNOSIS — N20.0 CALCULUS OF KIDNEY: ICD-10-CM

## 2024-11-06 DIAGNOSIS — N13.8 BENIGN PROSTATIC HYPERPLASIA WITH LOWER URINARY TRACT SYMPMS: ICD-10-CM

## 2024-11-06 DIAGNOSIS — N40.1 BENIGN PROSTATIC HYPERPLASIA WITH LOWER URINARY TRACT SYMPMS: ICD-10-CM

## 2024-11-06 DIAGNOSIS — Z12.5 ENCOUNTER FOR SCREENING FOR MALIGNANT NEOPLASM OF PROSTATE: ICD-10-CM

## 2024-11-06 PROCEDURE — 99214 OFFICE O/P EST MOD 30 MIN: CPT

## 2024-11-07 LAB — BACTERIA UR CULT: NORMAL

## 2024-12-24 PROBLEM — F10.90 ALCOHOL USE: Status: INACTIVE | Noted: 2022-03-14

## 2025-03-10 ENCOUNTER — RX RENEWAL (OUTPATIENT)
Age: 75
End: 2025-03-10

## 2025-05-07 ENCOUNTER — APPOINTMENT (OUTPATIENT)
Dept: UROLOGY | Facility: CLINIC | Age: 75
End: 2025-05-07
Payer: MEDICARE

## 2025-05-07 ENCOUNTER — OUTPATIENT (OUTPATIENT)
Dept: OUTPATIENT SERVICES | Facility: HOSPITAL | Age: 75
LOS: 1 days | End: 2025-05-07
Payer: MEDICARE

## 2025-05-07 ENCOUNTER — NON-APPOINTMENT (OUTPATIENT)
Age: 75
End: 2025-05-07

## 2025-05-07 VITALS
DIASTOLIC BLOOD PRESSURE: 81 MMHG | RESPIRATION RATE: 17 BRPM | HEART RATE: 88 BPM | HEIGHT: 73 IN | SYSTOLIC BLOOD PRESSURE: 143 MMHG | BODY MASS INDEX: 37.11 KG/M2 | TEMPERATURE: 98.4 F | WEIGHT: 280 LBS

## 2025-05-07 DIAGNOSIS — N32.3 DIVERTICULUM OF BLADDER: ICD-10-CM

## 2025-05-07 DIAGNOSIS — R33.8 OTHER RETENTION OF URINE: ICD-10-CM

## 2025-05-07 DIAGNOSIS — N20.0 CALCULUS OF KIDNEY: ICD-10-CM

## 2025-05-07 DIAGNOSIS — N40.1 BENIGN PROSTATIC HYPERPLASIA WITH LOWER URINARY TRACT SYMPMS: ICD-10-CM

## 2025-05-07 DIAGNOSIS — N13.8 BENIGN PROSTATIC HYPERPLASIA WITH LOWER URINARY TRACT SYMPMS: ICD-10-CM

## 2025-05-07 DIAGNOSIS — R35.0 FREQUENCY OF MICTURITION: ICD-10-CM

## 2025-05-07 DIAGNOSIS — Z96.659 PRESENCE OF UNSPECIFIED ARTIFICIAL KNEE JOINT: Chronic | ICD-10-CM

## 2025-05-07 PROCEDURE — 99213 OFFICE O/P EST LOW 20 MIN: CPT

## 2025-05-07 PROCEDURE — G2211 COMPLEX E/M VISIT ADD ON: CPT

## 2025-05-07 PROCEDURE — 76775 US EXAM ABDO BACK WALL LIM: CPT

## 2025-05-07 PROCEDURE — 76775 US EXAM ABDO BACK WALL LIM: CPT | Mod: 26

## 2025-05-08 DIAGNOSIS — N40.1 BENIGN PROSTATIC HYPERPLASIA WITH LOWER URINARY TRACT SYMPTOMS: ICD-10-CM

## 2025-05-08 DIAGNOSIS — N20.0 CALCULUS OF KIDNEY: ICD-10-CM

## 2025-05-12 ENCOUNTER — APPOINTMENT (OUTPATIENT)
Dept: UROLOGY | Facility: CLINIC | Age: 75
End: 2025-05-12

## 2025-07-10 NOTE — PATIENT PROFILE ADULT - NSPROMEDSBROUGHTTOHOSP_GEN_A_NUR
Problem: Discharge Planning  Goal: Discharge to home or other facility with appropriate resources  7/10/2025 0527 by Soraida Shen RN  Outcome: Progressing  7/9/2025 1552 by Joy Patel RN  Outcome: Progressing  Flowsheets (Taken 7/9/2025 1549)  Discharge to home or other facility with appropriate resources: Identify barriers to discharge with patient and caregiver     Problem: Pain  Goal: Verbalizes/displays adequate comfort level or baseline comfort level  7/10/2025 0527 by Soraida Shen RN  Outcome: Progressing  7/9/2025 1552 by Joy Patel RN  Outcome: Progressing     Problem: Safety - Adult  Goal: Free from fall injury  7/10/2025 0527 by Soraida Shen RN  Outcome: Progressing  7/9/2025 1552 by Joy Patel RN  Outcome: Progressing     Problem: ABCDS Injury Assessment  Goal: Absence of physical injury  Outcome: Progressing     
  Problem: Discharge Planning  Goal: Discharge to home or other facility with appropriate resources  7/10/2025 1014 by Joy Patel RN  Outcome: Progressing  Flowsheets (Taken 7/10/2025 0735)  Discharge to home or other facility with appropriate resources: Identify barriers to discharge with patient and caregiver  7/10/2025 0527 by Soraida Shen RN  Outcome: Progressing     Problem: Pain  Goal: Verbalizes/displays adequate comfort level or baseline comfort level  7/10/2025 1014 by Joy Patel RN  Outcome: Progressing  Flowsheets (Taken 7/10/2025 0749)  Verbalizes/displays adequate comfort level or baseline comfort level: Encourage patient to monitor pain and request assistance  7/10/2025 0527 by Soraida Shen RN  Outcome: Progressing     Problem: Safety - Adult  Goal: Free from fall injury  7/10/2025 1014 by Joy Patel RN  Outcome: Progressing  Flowsheets (Taken 7/10/2025 1012)  Free From Fall Injury: Instruct family/caregiver on patient safety  7/10/2025 0527 by Soraida Shen RN  Outcome: Progressing     Problem: ABCDS Injury Assessment  Goal: Absence of physical injury  7/10/2025 1014 by Joy Patel RN  Outcome: Progressing  Flowsheets (Taken 7/10/2025 1012)  Absence of Physical Injury: Implement safety measures based on patient assessment  7/10/2025 0527 by Soraida Shen RN  Outcome: Progressing     
  Problem: Discharge Planning  Goal: Discharge to home or other facility with appropriate resources  7/10/2025 1307 by Joy Patel RN  Outcome: Completed  7/10/2025 1014 by Joy Patel RN  Outcome: Progressing  Flowsheets (Taken 7/10/2025 0735)  Discharge to home or other facility with appropriate resources: Identify barriers to discharge with patient and caregiver  7/10/2025 0527 by Soraida Shen RN  Outcome: Progressing     Problem: Pain  Goal: Verbalizes/displays adequate comfort level or baseline comfort level  7/10/2025 1307 by Joy Patel RN  Outcome: Completed  7/10/2025 1014 by Joy Patel RN  Outcome: Progressing  Flowsheets (Taken 7/10/2025 0749)  Verbalizes/displays adequate comfort level or baseline comfort level: Encourage patient to monitor pain and request assistance  7/10/2025 0527 by Soraida Shen RN  Outcome: Progressing     Problem: Safety - Adult  Goal: Free from fall injury  7/10/2025 1307 by Joy Patel RN  Outcome: Completed  7/10/2025 1014 by Joy Patel RN  Outcome: Progressing  Flowsheets (Taken 7/10/2025 1012)  Free From Fall Injury: Instruct family/caregiver on patient safety  7/10/2025 0527 by Soraida Shen RN  Outcome: Progressing     Problem: ABCDS Injury Assessment  Goal: Absence of physical injury  7/10/2025 1307 by Joy Patel RN  Outcome: Completed  7/10/2025 1014 by Joy Patel RN  Outcome: Progressing  Flowsheets (Taken 7/10/2025 1012)  Absence of Physical Injury: Implement safety measures based on patient assessment  7/10/2025 0527 by Soraida Shen RN  Outcome: Progressing     
  Problem: Discharge Planning  Goal: Discharge to home or other facility with appropriate resources  Outcome: Progressing  Flowsheets (Taken 7/9/2025 7543)  Discharge to home or other facility with appropriate resources: Identify barriers to discharge with patient and caregiver     Problem: Pain  Goal: Verbalizes/displays adequate comfort level or baseline comfort level  Outcome: Progressing     Problem: Safety - Adult  Goal: Free from fall injury  Outcome: Progressing     
Face-to-Face    Patient seen and examined.  Discussed the need for medical equipment to assist with their recovery during the post-operative period.  Based on the patient's current physical condition and post-operative restrictions, we have determined that they will need: wheeled walker.     SHELLIE BLANCO DO  Orthopedic Surgery Resident, PGY-1  [place], [place]  1:25 PM 7/9/2025   
no